# Patient Record
Sex: MALE | Race: WHITE | NOT HISPANIC OR LATINO | Employment: UNEMPLOYED | URBAN - METROPOLITAN AREA
[De-identification: names, ages, dates, MRNs, and addresses within clinical notes are randomized per-mention and may not be internally consistent; named-entity substitution may affect disease eponyms.]

---

## 2021-09-30 PROBLEM — K51.90 ULCERATIVE COLITIS: Status: ACTIVE | Noted: 2021-09-30

## 2021-09-30 PROBLEM — I10 HTN (HYPERTENSION): Status: ACTIVE | Noted: 2021-09-30

## 2021-09-30 PROBLEM — R07.9 CHEST PAIN: Status: ACTIVE | Noted: 2021-09-30

## 2021-09-30 PROBLEM — F12.90 MARIJUANA USE: Status: ACTIVE | Noted: 2021-09-30

## 2021-09-30 PROBLEM — R79.89 LOW VITAMIN D LEVEL: Status: ACTIVE | Noted: 2021-09-30

## 2021-09-30 PROBLEM — M54.9 BACK PAIN: Status: ACTIVE | Noted: 2021-09-30

## 2021-10-01 PROBLEM — F17.210 CIGARETTE NICOTINE DEPENDENCE WITHOUT COMPLICATION: Status: ACTIVE | Noted: 2021-10-01

## 2021-10-01 PROBLEM — R00.1 BRADYCARDIA: Status: ACTIVE | Noted: 2021-10-01

## 2021-10-01 PROBLEM — R74.01 TRANSAMINITIS: Status: ACTIVE | Noted: 2021-10-01

## 2022-04-12 PROBLEM — Z72.0 TOBACCO USE: Status: ACTIVE | Noted: 2022-04-12

## 2022-04-12 PROBLEM — F12.90 MARIJUANA USE: Status: RESOLVED | Noted: 2021-09-30 | Resolved: 2022-04-12

## 2023-01-23 PROBLEM — M51.36 DEGENERATIVE DISC DISEASE, LUMBAR: Status: ACTIVE | Noted: 2023-01-23

## 2023-01-23 PROBLEM — M51.369 DEGENERATIVE DISC DISEASE, LUMBAR: Status: ACTIVE | Noted: 2023-01-23

## 2023-02-22 PROBLEM — M19.131: Status: ACTIVE | Noted: 2023-02-22

## 2023-02-22 PROBLEM — S62.001S: Status: ACTIVE | Noted: 2023-02-22

## 2023-07-10 PROBLEM — M54.50 LOW BACK PAIN RADIATING TO RIGHT LEG: Status: ACTIVE | Noted: 2021-09-30

## 2023-07-10 PROBLEM — R20.0 NUMBNESS AND TINGLING: Status: ACTIVE | Noted: 2023-07-10

## 2023-07-10 PROBLEM — M79.604 LOW BACK PAIN RADIATING TO RIGHT LEG: Status: ACTIVE | Noted: 2021-09-30

## 2023-07-10 PROBLEM — R20.2 NUMBNESS AND TINGLING: Status: ACTIVE | Noted: 2023-07-10

## 2023-08-22 PROBLEM — M48.062 SPINAL STENOSIS OF LUMBAR REGION WITH NEUROGENIC CLAUDICATION: Status: ACTIVE | Noted: 2023-08-22

## 2025-07-26 RX ORDER — IBUPROFEN 200 MG
16 TABLET ORAL
Status: CANCELLED | OUTPATIENT
Start: 2025-07-26

## 2025-07-26 RX ORDER — SODIUM CHLORIDE 0.9 % (FLUSH) 0.9 %
10 SYRINGE (ML) INJECTION EVERY 12 HOURS PRN
Status: CANCELLED | OUTPATIENT
Start: 2025-07-26

## 2025-07-26 RX ORDER — IBUPROFEN 200 MG
24 TABLET ORAL
Status: CANCELLED | OUTPATIENT
Start: 2025-07-26

## 2025-07-26 RX ORDER — NALOXONE HCL 0.4 MG/ML
0.02 VIAL (ML) INJECTION
Status: CANCELLED | OUTPATIENT
Start: 2025-07-26

## 2025-07-26 RX ORDER — GLUCAGON 1 MG
1 KIT INJECTION
Status: CANCELLED | OUTPATIENT
Start: 2025-07-26

## 2025-07-26 NOTE — PROVIDER TRANSFER
Outside Transfer Acceptance Note / [facility]    Referring facility:    Referring provider: AFRICA ARCE  Accepting facility: [facility]  Accepting provider: ILENE SPRAGUE  Admitting provider: Dr. Ilene Sprague  Reason for transfer: Higher Level of Care   Transfer diagnosis: alcoholic cirrhosis of liver  Transfer specialty requested: Hepatology  Transfer specialty notified: No  Transfer level: NUMBER 1-5: 2  Bed type requested: telemetry  Isolation status: No active isolations   Admission class or status: IP- Inpatient      Narrative     65 year old male with PMHx of alcoholic cirrhosis and was just d/c from [facility] one week ago where he had an MRCP which was negative for stones who presented with fatigue/weakness and jaundice.     In the ED, vitals stable however labs show elevated LFT's with Total bilirubin of 12, , , alk phos 646, INR 2.0. Blood cultures in process, ammonia level normal. US shows biliary dilatation and cannot rule out choledocholithiasis. ED provider spoke with GI who recommended transfer for hepatology due to MELD of 24.     Vitals stable. /64, HR 80, 95% on RA and afebrile. Has mild ascites but not enough to do a tap. Patient is alert and oriented, not encephalopathic. Given IV zosyn and stable for transfer for hepatology evaluation.     Objective     Vitals:    Recent Labs: All pertinent labs within the past 24 hours have been reviewed.  Recent imaging: as above   Airway:     Vent settings:         IV access:    Infusions: None  Allergies: Review of patient's allergies indicates:  No Known Allergies   NPO: No    Anticoagulation:   Anticoagulants       None             Instructions      [facility]-  Admit to Hospital Medicine  Upon arrival to the floor, please send a SecureChat to [facility]. If there are emergent issues, or if there is no response to the SecureChat within 15 minutes, call extension [phone] (if no answer, do  NOT leave a callback number after the beep, rather please call the  to connect you with the  Hospital Medicine admit line on-call physician), for Hospital Medicine admit team assignment and for additional admit orders for the patient. Do not page the attending physician associated with the patient on arrival (this physician may not be on duty at the time of arrival). Rather, always send a SecureChat to [facility] (subsequently call [phone] if needed) to reach the triage physician for orders and team assignment.    Consult hepatology

## 2025-07-27 NOTE — PROVIDER TRANSFER
Update:  See Dr Schwartz's initial  acceptance note. Boardingin ED >24 hours, so reconnected with referring MD.  For this cirrhotic patient with probable choledocholithiasis and ascending cholangitis, found to have E coli bacteremia on initial BCx, has been covered by Zosyn the whole time which is good, and clinically improving. ED asked their hospitalist to see him in ED today. WBC count 18 initially, down to 14 yesterday, no labs done today. Zosyn hanging right now around 530pm so can time the next dose whenever it will be due -- very important for no gap in ABx given the g neg bacteremia and still needs source control with AES for ERCP. VS good 120/76, 97 O2 sat, HR 70. [facility] [place] notified of update. Bed assigned on TSU [id number].

## 2025-07-28 ENCOUNTER — ANESTHESIA EVENT (OUTPATIENT)
Dept: ENDOSCOPY | Facility: HOSPITAL | Age: 65
End: 2025-07-28
Payer: MEDICARE

## 2025-07-28 ENCOUNTER — HOSPITAL ENCOUNTER (INPATIENT)
Facility: HOSPITAL | Age: 65
LOS: 2 days | Discharge: HOME | DRG: 871 | End: 2025-07-30
Attending: STUDENT IN AN ORGANIZED HEALTH CARE EDUCATION/TRAINING PROGRAM | Admitting: STUDENT IN AN ORGANIZED HEALTH CARE EDUCATION/TRAINING PROGRAM
Payer: MEDICARE

## 2025-07-28 DIAGNOSIS — K70.30 ALCOHOLIC CIRRHOSIS: ICD-10-CM

## 2025-07-28 DIAGNOSIS — R74.01 TRANSAMINITIS: ICD-10-CM

## 2025-07-28 DIAGNOSIS — Z91.89 AT RISK FOR LONG QT SYNDROME: ICD-10-CM

## 2025-07-28 DIAGNOSIS — I73.9 PAD (PERIPHERAL ARTERY DISEASE): ICD-10-CM

## 2025-07-28 DIAGNOSIS — B96.20 E COLI BACTEREMIA: ICD-10-CM

## 2025-07-28 DIAGNOSIS — R07.9 CHEST PAIN: ICD-10-CM

## 2025-07-28 DIAGNOSIS — K83.1 BILIARY OBSTRUCTION: Primary | ICD-10-CM

## 2025-07-28 DIAGNOSIS — R78.81 E COLI BACTEREMIA: ICD-10-CM

## 2025-07-28 PROBLEM — D72.829 LEUCOCYTOSIS: Status: ACTIVE | Noted: 2025-07-28

## 2025-07-28 PROBLEM — E78.5 HLD (HYPERLIPIDEMIA): Status: ACTIVE | Noted: 2025-07-28

## 2025-07-28 PROBLEM — K74.60 DECOMPENSATED CIRRHOSIS: Status: ACTIVE | Noted: 2025-07-28

## 2025-07-28 PROBLEM — K83.8 DILATION OF BILIARY TRACT: Status: ACTIVE | Noted: 2025-07-28

## 2025-07-28 PROBLEM — K72.90 DECOMPENSATED CIRRHOSIS: Status: ACTIVE | Noted: 2025-07-28

## 2025-07-28 PROBLEM — A41.9 SEPSIS: Status: ACTIVE | Noted: 2025-07-28

## 2025-07-28 PROBLEM — I10 HTN (HYPERTENSION): Status: ACTIVE | Noted: 2025-07-28

## 2025-07-28 PROBLEM — D75.839 THROMBOCYTOSIS: Status: ACTIVE | Noted: 2025-07-28

## 2025-07-28 PROBLEM — E87.6 HYPOKALEMIA: Status: ACTIVE | Noted: 2025-07-28

## 2025-07-28 PROBLEM — F32.A DEPRESSION: Status: ACTIVE | Noted: 2025-07-28

## 2025-07-28 PROBLEM — D64.9 NORMOCYTIC ANEMIA: Status: ACTIVE | Noted: 2025-07-28

## 2025-07-28 PROBLEM — E80.6 HYPERBILIRUBINEMIA: Status: ACTIVE | Noted: 2025-07-28

## 2025-07-28 PROBLEM — F10.90 ALCOHOL USE: Status: ACTIVE | Noted: 2025-07-28

## 2025-07-28 LAB
ABSOLUTE EOSINOPHIL (OHS): 0.24 K/UL
ABSOLUTE MONOCYTE (OHS): 0.88 K/UL (ref 0.3–1)
ABSOLUTE NEUTROPHIL COUNT (OHS): 10.42 K/UL (ref 1.8–7.7)
ALBUMIN FLD-MCNC: <0.4 G/DL
ALBUMIN SERPL BCP-MCNC: 2 G/DL (ref 3.5–5.2)
ALP SERPL-CCNC: 671 UNIT/L (ref 40–150)
ALT SERPL W/O P-5'-P-CCNC: 104 UNIT/L (ref 0–55)
AMMONIA PLAS-SCNC: 23 UMOL/L (ref 10–50)
ANION GAP (OHS): 10 MMOL/L (ref 8–16)
APPEARANCE FLD: CLEAR
APTT PPP: 25.8 SECONDS (ref 21–32)
AST SERPL-CCNC: 186 UNIT/L (ref 0–50)
BASOPHILS # BLD AUTO: 0.14 K/UL
BASOPHILS NFR BLD AUTO: 1 %
BILIRUB DIRECT SERPL-MCNC: 6.1 MG/DL (ref 0.1–0.3)
BILIRUB SERPL-MCNC: 9 MG/DL (ref 0.1–1)
BILIRUB UR QL STRIP.AUTO: ABNORMAL
BUN SERPL-MCNC: 15 MG/DL (ref 8–23)
CALCIUM SERPL-MCNC: 8.7 MG/DL (ref 8.7–10.5)
CHLORIDE SERPL-SCNC: 104 MMOL/L (ref 95–110)
CLARITY UR: CLEAR
CO2 SERPL-SCNC: 24 MMOL/L (ref 23–29)
COLOR FLD: YELLOW
COLOR UR AUTO: YELLOW
CREAT SERPL-MCNC: 0.6 MG/DL (ref 0.5–1.4)
CREAT UR-MCNC: 115 MG/DL (ref 23–375)
CRP SERPL-MCNC: 75.78 MG/L
ERYTHROCYTE [DISTWIDTH] IN BLOOD BY AUTOMATED COUNT: 16.3 % (ref 11.5–14.5)
ETHANOL SERPL-MCNC: <10 MG/DL
FERRITIN SERPL-MCNC: 1259 NG/ML (ref 20–300)
GFR SERPLBLD CREATININE-BSD FMLA CKD-EPI: >60 ML/MIN/1.73/M2
GGT SERPL-CCNC: 226 U/L (ref 8–55)
GGT SERPL-CCNC: 230 U/L (ref 8–55)
GLUCOSE SERPL-MCNC: 103 MG/DL (ref 70–110)
GLUCOSE UR QL STRIP: NEGATIVE
GRAM STN SPEC: NORMAL
GRAM STN SPEC: NORMAL
HCT VFR BLD AUTO: 34.3 % (ref 40–54)
HGB BLD-MCNC: 11 GM/DL (ref 14–18)
HGB UR QL STRIP: ABNORMAL
IMM GRANULOCYTES # BLD AUTO: 0.16 K/UL (ref 0–0.04)
IMM GRANULOCYTES NFR BLD AUTO: 1.2 % (ref 0–0.5)
INR PPP: 1.2 (ref 0.8–1.2)
IRON SATN MFR SERPL: 41 % (ref 20–50)
IRON SERPL-MCNC: 79 UG/DL (ref 45–160)
KETONES UR QL STRIP: NEGATIVE
LACTATE SERPL-SCNC: 1.5 MMOL/L (ref 0.5–2.2)
LDH FLD L TO P-CCNC: 39 U/L
LEUKOCYTE ESTERASE UR QL STRIP: NEGATIVE
LYMPHOCYTES # BLD AUTO: 1.77 K/UL (ref 1–4.8)
LYMPHOCYTES NFR FLD MANUAL: 49 %
MAGNESIUM SERPL-MCNC: 1.9 MG/DL (ref 1.6–2.6)
MCH RBC QN AUTO: 31.3 PG (ref 27–31)
MCHC RBC AUTO-ENTMCNC: 32.1 G/DL (ref 32–36)
MCV RBC AUTO: 98 FL (ref 82–98)
MESOTHL CELL NFR FLD MANUAL: 4 %
MONOS+MACROS NFR FLD MANUAL: 19 %
NEUTROPHILS NFR FLD MANUAL: 28 %
NITRITE UR QL STRIP: NEGATIVE
NUCLEATED RBC (/100WBC) (OHS): 0 /100 WBC
PH UR STRIP: 6 [PH]
PHOSPHATE SERPL-MCNC: 3.5 MG/DL (ref 2.7–4.5)
PLATELET # BLD AUTO: 736 K/UL (ref 150–450)
PMV BLD AUTO: 8.7 FL (ref 9.2–12.9)
POCT GLUCOSE: 109 MG/DL (ref 70–110)
POCT GLUCOSE: 113 MG/DL (ref 70–110)
POCT GLUCOSE: 131 MG/DL (ref 70–110)
POCT GLUCOSE: 95 MG/DL (ref 70–110)
POTASSIUM SERPL-SCNC: 3.2 MMOL/L (ref 3.5–5.1)
PROCALCITONIN SERPL-MCNC: 0.47 NG/ML
PROT FLD-MCNC: <1 G/DL
PROT SERPL-MCNC: 6.9 GM/DL (ref 6–8.4)
PROT UR QL STRIP: NEGATIVE
PROTHROMBIN TIME: 12.7 SECONDS (ref 9–12.5)
RBC # BLD AUTO: 3.51 M/UL (ref 4.6–6.2)
RELATIVE EOSINOPHIL (OHS): 1.8 %
RELATIVE LYMPHOCYTE (OHS): 13 % (ref 18–48)
RELATIVE MONOCYTE (OHS): 6.5 % (ref 4–15)
RELATIVE NEUTROPHIL (OHS): 76.5 % (ref 38–73)
SODIUM SERPL-SCNC: 138 MMOL/L (ref 136–145)
SODIUM UR-SCNC: 76 MMOL/L (ref 20–250)
SP GR UR STRIP: 1.02
TIBC SERPL-MCNC: 195 UG/DL (ref 250–450)
TRANSFERRIN SERPL-MCNC: 132 MG/DL (ref 200–375)
UROBILINOGEN UR STRIP-ACNC: ABNORMAL EU/DL
WBC # BLD AUTO: 13.61 K/UL (ref 3.9–12.7)
WBC # FLD: 10 /CU MM

## 2025-07-28 PROCEDURE — 89051 BODY FLUID CELL COUNT: CPT | Performed by: HOSPITALIST

## 2025-07-28 PROCEDURE — 36415 COLL VENOUS BLD VENIPUNCTURE: CPT | Performed by: HOSPITALIST

## 2025-07-28 PROCEDURE — 80053 COMPREHEN METABOLIC PANEL: CPT | Performed by: STUDENT IN AN ORGANIZED HEALTH CARE EDUCATION/TRAINING PROGRAM

## 2025-07-28 PROCEDURE — 83605 ASSAY OF LACTIC ACID: CPT | Performed by: STUDENT IN AN ORGANIZED HEALTH CARE EDUCATION/TRAINING PROGRAM

## 2025-07-28 PROCEDURE — 87075 CULTR BACTERIA EXCEPT BLOOD: CPT | Performed by: HOSPITALIST

## 2025-07-28 PROCEDURE — 36415 COLL VENOUS BLD VENIPUNCTURE: CPT | Performed by: STUDENT IN AN ORGANIZED HEALTH CARE EDUCATION/TRAINING PROGRAM

## 2025-07-28 PROCEDURE — 63600175 PHARM REV CODE 636 W HCPCS: Performed by: STUDENT IN AN ORGANIZED HEALTH CARE EDUCATION/TRAINING PROGRAM

## 2025-07-28 PROCEDURE — 0W9G3ZZ DRAINAGE OF PERITONEAL CAVITY, PERCUTANEOUS APPROACH: ICD-10-PCS | Performed by: HOSPITALIST

## 2025-07-28 PROCEDURE — 25000003 PHARM REV CODE 250: Performed by: STUDENT IN AN ORGANIZED HEALTH CARE EDUCATION/TRAINING PROGRAM

## 2025-07-28 PROCEDURE — 87205 SMEAR GRAM STAIN: CPT | Performed by: HOSPITALIST

## 2025-07-28 PROCEDURE — 82042 OTHER SOURCE ALBUMIN QUAN EA: CPT | Performed by: HOSPITALIST

## 2025-07-28 PROCEDURE — 84300 ASSAY OF URINE SODIUM: CPT | Performed by: STUDENT IN AN ORGANIZED HEALTH CARE EDUCATION/TRAINING PROGRAM

## 2025-07-28 PROCEDURE — 84100 ASSAY OF PHOSPHORUS: CPT | Performed by: STUDENT IN AN ORGANIZED HEALTH CARE EDUCATION/TRAINING PROGRAM

## 2025-07-28 PROCEDURE — 82570 ASSAY OF URINE CREATININE: CPT | Performed by: STUDENT IN AN ORGANIZED HEALTH CARE EDUCATION/TRAINING PROGRAM

## 2025-07-28 PROCEDURE — 85025 COMPLETE CBC W/AUTO DIFF WBC: CPT | Performed by: STUDENT IN AN ORGANIZED HEALTH CARE EDUCATION/TRAINING PROGRAM

## 2025-07-28 PROCEDURE — 82728 ASSAY OF FERRITIN: CPT | Performed by: STUDENT IN AN ORGANIZED HEALTH CARE EDUCATION/TRAINING PROGRAM

## 2025-07-28 PROCEDURE — 87040 BLOOD CULTURE FOR BACTERIA: CPT | Performed by: STUDENT IN AN ORGANIZED HEALTH CARE EDUCATION/TRAINING PROGRAM

## 2025-07-28 PROCEDURE — 86141 C-REACTIVE PROTEIN HS: CPT | Performed by: STUDENT IN AN ORGANIZED HEALTH CARE EDUCATION/TRAINING PROGRAM

## 2025-07-28 PROCEDURE — 82977 ASSAY OF GGT: CPT | Performed by: STUDENT IN AN ORGANIZED HEALTH CARE EDUCATION/TRAINING PROGRAM

## 2025-07-28 PROCEDURE — 83735 ASSAY OF MAGNESIUM: CPT | Performed by: STUDENT IN AN ORGANIZED HEALTH CARE EDUCATION/TRAINING PROGRAM

## 2025-07-28 PROCEDURE — 99223 1ST HOSP IP/OBS HIGH 75: CPT | Mod: GC,,, | Performed by: STUDENT IN AN ORGANIZED HEALTH CARE EDUCATION/TRAINING PROGRAM

## 2025-07-28 PROCEDURE — 82140 ASSAY OF AMMONIA: CPT | Performed by: STUDENT IN AN ORGANIZED HEALTH CARE EDUCATION/TRAINING PROGRAM

## 2025-07-28 PROCEDURE — 87070 CULTURE OTHR SPECIMN AEROBIC: CPT | Performed by: HOSPITALIST

## 2025-07-28 PROCEDURE — 84157 ASSAY OF PROTEIN OTHER: CPT | Performed by: HOSPITALIST

## 2025-07-28 PROCEDURE — 83540 ASSAY OF IRON: CPT | Performed by: STUDENT IN AN ORGANIZED HEALTH CARE EDUCATION/TRAINING PROGRAM

## 2025-07-28 PROCEDURE — 80321 ALCOHOLS BIOMARKERS 1OR 2: CPT | Performed by: STUDENT IN AN ORGANIZED HEALTH CARE EDUCATION/TRAINING PROGRAM

## 2025-07-28 PROCEDURE — 20600001 HC STEP DOWN PRIVATE ROOM

## 2025-07-28 PROCEDURE — 81003 URINALYSIS AUTO W/O SCOPE: CPT | Performed by: STUDENT IN AN ORGANIZED HEALTH CARE EDUCATION/TRAINING PROGRAM

## 2025-07-28 PROCEDURE — 83615 LACTATE (LD) (LDH) ENZYME: CPT | Performed by: HOSPITALIST

## 2025-07-28 PROCEDURE — 25000003 PHARM REV CODE 250: Performed by: HOSPITALIST

## 2025-07-28 PROCEDURE — 85610 PROTHROMBIN TIME: CPT | Performed by: STUDENT IN AN ORGANIZED HEALTH CARE EDUCATION/TRAINING PROGRAM

## 2025-07-28 PROCEDURE — 63600175 PHARM REV CODE 636 W HCPCS: Performed by: FAMILY MEDICINE

## 2025-07-28 PROCEDURE — G0545 PR VISIT INHERENT TO INPT OR OBS CARE, INFECTIOUS DISEASE: HCPCS | Mod: ,,, | Performed by: STUDENT IN AN ORGANIZED HEALTH CARE EDUCATION/TRAINING PROGRAM

## 2025-07-28 PROCEDURE — 82077 ASSAY SPEC XCP UR&BREATH IA: CPT | Performed by: STUDENT IN AN ORGANIZED HEALTH CARE EDUCATION/TRAINING PROGRAM

## 2025-07-28 PROCEDURE — 99223 1ST HOSP IP/OBS HIGH 75: CPT | Mod: 25,GC,, | Performed by: INTERNAL MEDICINE

## 2025-07-28 PROCEDURE — 82248 BILIRUBIN DIRECT: CPT | Performed by: STUDENT IN AN ORGANIZED HEALTH CARE EDUCATION/TRAINING PROGRAM

## 2025-07-28 PROCEDURE — 87040 BLOOD CULTURE FOR BACTERIA: CPT | Performed by: HOSPITALIST

## 2025-07-28 PROCEDURE — 84145 PROCALCITONIN (PCT): CPT | Performed by: STUDENT IN AN ORGANIZED HEALTH CARE EDUCATION/TRAINING PROGRAM

## 2025-07-28 PROCEDURE — 85730 THROMBOPLASTIN TIME PARTIAL: CPT | Performed by: STUDENT IN AN ORGANIZED HEALTH CARE EDUCATION/TRAINING PROGRAM

## 2025-07-28 RX ORDER — TALC
6 POWDER (GRAM) TOPICAL NIGHTLY PRN
Status: DISCONTINUED | OUTPATIENT
Start: 2025-07-28 | End: 2025-07-30 | Stop reason: HOSPADM

## 2025-07-28 RX ORDER — ACETAMINOPHEN 325 MG/1
650 TABLET ORAL EVERY 4 HOURS PRN
Status: DISCONTINUED | OUTPATIENT
Start: 2025-07-28 | End: 2025-07-28

## 2025-07-28 RX ORDER — NALOXONE HCL 0.4 MG/ML
0.4 VIAL (ML) INJECTION
Status: DISCONTINUED | OUTPATIENT
Start: 2025-07-28 | End: 2025-07-30 | Stop reason: HOSPADM

## 2025-07-28 RX ORDER — POLYETHYLENE GLYCOL 3350 17 G/17G
17 POWDER, FOR SOLUTION ORAL DAILY PRN
Status: DISCONTINUED | OUTPATIENT
Start: 2025-07-28 | End: 2025-07-30 | Stop reason: HOSPADM

## 2025-07-28 RX ORDER — GLUCAGON 1 MG
1 KIT INJECTION
Status: DISCONTINUED | OUTPATIENT
Start: 2025-07-28 | End: 2025-07-30 | Stop reason: HOSPADM

## 2025-07-28 RX ORDER — MUPIROCIN 20 MG/G
OINTMENT TOPICAL 2 TIMES DAILY
Status: DISCONTINUED | OUTPATIENT
Start: 2025-07-28 | End: 2025-07-30 | Stop reason: HOSPADM

## 2025-07-28 RX ORDER — IBUPROFEN 200 MG
16 TABLET ORAL
Status: DISCONTINUED | OUTPATIENT
Start: 2025-07-28 | End: 2025-07-30 | Stop reason: HOSPADM

## 2025-07-28 RX ORDER — PROCHLORPERAZINE MALEATE 5 MG
5 TABLET ORAL EVERY 6 HOURS PRN
Status: DISCONTINUED | OUTPATIENT
Start: 2025-07-28 | End: 2025-07-30 | Stop reason: HOSPADM

## 2025-07-28 RX ORDER — ACETAMINOPHEN 325 MG/1
650 TABLET ORAL EVERY 4 HOURS PRN
Status: DISCONTINUED | OUTPATIENT
Start: 2025-07-28 | End: 2025-07-30 | Stop reason: HOSPADM

## 2025-07-28 RX ORDER — SODIUM CHLORIDE 0.9 % (FLUSH) 0.9 %
10 SYRINGE (ML) INJECTION EVERY 12 HOURS PRN
Status: DISCONTINUED | OUTPATIENT
Start: 2025-07-28 | End: 2025-07-30 | Stop reason: HOSPADM

## 2025-07-28 RX ORDER — BUPROPION HYDROCHLORIDE 100 MG/1
100 TABLET ORAL 2 TIMES DAILY
Status: DISCONTINUED | OUTPATIENT
Start: 2025-07-28 | End: 2025-07-30 | Stop reason: HOSPADM

## 2025-07-28 RX ORDER — GABAPENTIN 300 MG/1
600 CAPSULE ORAL 2 TIMES DAILY
Status: DISCONTINUED | OUTPATIENT
Start: 2025-07-28 | End: 2025-07-30 | Stop reason: HOSPADM

## 2025-07-28 RX ORDER — LIDOCAINE HYDROCHLORIDE 10 MG/ML
INJECTION, SOLUTION INFILTRATION; PERINEURAL
Status: COMPLETED | OUTPATIENT
Start: 2025-07-28 | End: 2025-07-28

## 2025-07-28 RX ORDER — IBUPROFEN 200 MG
24 TABLET ORAL
Status: DISCONTINUED | OUTPATIENT
Start: 2025-07-28 | End: 2025-07-30 | Stop reason: HOSPADM

## 2025-07-28 RX ORDER — POTASSIUM CHLORIDE 20 MEQ/1
40 TABLET, EXTENDED RELEASE ORAL ONCE
Status: COMPLETED | OUTPATIENT
Start: 2025-07-28 | End: 2025-07-28

## 2025-07-28 RX ORDER — AMOXICILLIN 250 MG
1 CAPSULE ORAL 2 TIMES DAILY PRN
Status: DISCONTINUED | OUTPATIENT
Start: 2025-07-28 | End: 2025-07-30 | Stop reason: HOSPADM

## 2025-07-28 RX ADMIN — BUPROPION HYDROCHLORIDE 100 MG: 100 TABLET, FILM COATED ORAL at 10:07

## 2025-07-28 RX ADMIN — POTASSIUM BICARBONATE 40 MEQ: 391 TABLET, EFFERVESCENT ORAL at 03:07

## 2025-07-28 RX ADMIN — GABAPENTIN 600 MG: 300 CAPSULE ORAL at 09:07

## 2025-07-28 RX ADMIN — MUPIROCIN: 20 OINTMENT TOPICAL at 09:07

## 2025-07-28 RX ADMIN — PIPERACILLIN SODIUM AND TAZOBACTAM SODIUM 4.5 G: 4; .5 INJECTION, POWDER, LYOPHILIZED, FOR SOLUTION INTRAVENOUS at 03:07

## 2025-07-28 RX ADMIN — PIPERACILLIN SODIUM AND TAZOBACTAM SODIUM 4.5 G: 4; .5 INJECTION, POWDER, LYOPHILIZED, FOR SOLUTION INTRAVENOUS at 11:07

## 2025-07-28 RX ADMIN — BUPROPION HYDROCHLORIDE 100 MG: 100 TABLET, FILM COATED ORAL at 09:07

## 2025-07-28 RX ADMIN — GABAPENTIN 600 MG: 300 CAPSULE ORAL at 10:07

## 2025-07-28 RX ADMIN — LIDOCAINE HYDROCHLORIDE 5 ML: 10 INJECTION, SOLUTION INFILTRATION; PERINEURAL at 09:07

## 2025-07-28 RX ADMIN — MUPIROCIN: 20 OINTMENT TOPICAL at 10:07

## 2025-07-28 RX ADMIN — LACTULOSE 10 G: 20 SOLUTION ORAL at 09:07

## 2025-07-28 RX ADMIN — LACTULOSE 10 G: 20 SOLUTION ORAL at 10:07

## 2025-07-28 RX ADMIN — POTASSIUM CHLORIDE 40 MEQ: 1500 TABLET, EXTENDED RELEASE ORAL at 10:07

## 2025-07-28 RX ADMIN — PIPERACILLIN SODIUM AND TAZOBACTAM SODIUM 4.5 G: 4; .5 INJECTION, POWDER, LYOPHILIZED, FOR SOLUTION INTRAVENOUS at 06:07

## 2025-07-28 NOTE — H&P
[facility] - Transplant [facility] Medicine  History & Physical    Patient Name: Eric Ma  MRN: [id number]  Patient Class: IP- Inpatient  Admission Date: 7/28/2025  Attending Physician: Lake Jones MD   Primary Care Provider: Adelita Hernandez NP      Patient information was obtained from patient, past medical records, and ER records.     Subjective:     Principal Problem:E coli bacteremia    Chief Complaint: decompensated cirrhosis     HPI: Eric Ma is 65 y.o. male with history of alcohol use disorder (quit 04/2025, intermittently sober over the last 2yrs), decompensated cirrhosis, UC, HTN, HLD, and depression who presents as a transfer for evaluation of biliary obstruction and decompensated cirrhosis with sepsis 2/2 e coli bacteremia concerning for ascending cholangitis.     Pt initially dx with cirrhosis thought to be alcoholic in nature in 04/2024 while following with his GI outpatient. Elevated LFTs, hyperbili noted initially 6/30 for which pt was admitted to OSH at that time with workup showing mild biliary duct dilation. MRCP performed at that time that did not show choledocholithiasis or significant ductal dilation and pt's LFTs remained elevated but bili downtrended. Pt discharged with GI follow up. However pt with persistent abd pain, distention, and jaundice as well as fever/chills so represented with reoccurrence of worsening cholestatic pattern of labs as well as elevated WBC to 18k and procal 1.2. CBD dilation noted again on U/S as well as moderate ascites at OSH 7/25 w/o evidence of choledocholithiasis. Pt discussed with GI at [facility] who recommended transfer for evaluation with hepatology and other services. Pt also found to have e coli bacteremia during workup while awaiting transfer for which he was started on zosyn with improvement in WBC and procal as well as symptomatically.    At time of admit evaluation, pt reports that he overall feels improved. His fever/chills  and abd pain have improved/nearly resolved. His abd distention remains stable. He notes some mild BL LE edema that he states occurred during transport. He reports that he has chronic left sided chest/rib soreness from a prior crush injury which required hardware implantation which is stable. He otherwise denies URI sxs, cough, dyspnea, hematochezia/melena, constipation, diarrhea, nausea, vomiting, decreased UOP, dysuria, dizziness, syncope, confusion.          Past Medical History:   Diagnosis Date    Back pain     Hypertension     Ulcerative colitis        Past Surgical History:   Procedure Laterality Date    ANGIOGRAM, CORONARY, WITH LEFT HEART CATHETERIZATION  10/01/2021    Procedure: Angiogram, Coronary, with Left Heart Cath;  Surgeon: Gabriele Reynolds MD;  Location: [facility] CATH LAB;  Service: Cardiology;;    APPENDECTOMY      CARPECTOMY Right 02/09/2023    Procedure: CARPECTOMY;  Surgeon: Rai Carroll MD;  Location: [facility] MAIN OR;  Service: Orthopedics;  Laterality: Right;  PRC    FRACTURE SURGERY      LAMINECTOMY, SPINE, MINIMALLY INVASIVE, POSTERIOR APPROACH N/A 8/22/2023    Procedure: L4-S1 Decompressive Lumbar Laminectomy;  Surgeon: Cynthia Velazquez MD;  Location: [facility] MAIN OR;  Service: Neurosurgery;  Laterality: N/A;       Review of patient's allergies indicates:  No Known Allergies    No current facility-administered medications on file prior to encounter.     Current Outpatient Medications on File Prior to Encounter   Medication Sig    atorvastatin (LIPITOR) 40 MG tablet Take 1 tablet (40 mg total) by mouth once daily.    buPROPion (WELLBUTRIN SR) 100 MG TBSR 12 hr tablet Take 100 mg by mouth 2 (two) times daily.    cholecalciferol, vitamin D3, (VITAMIN D3) 50 mcg (2,000 unit) Cap capsule Take 1 capsule (2,000 Units total) by mouth once daily.    gabapentin (NEURONTIN) 600 MG tablet Take 0.5 tablets (300 mg total) by mouth every evening. (Patient taking differently: Take 300 mg by mouth 3 (three)  times daily.)    isosorbide mononitrate (IMDUR) 30 MG 24 hr tablet Take 1 tablet (30 mg total) by mouth once daily.    lisinopriL (PRINIVIL,ZESTRIL) 5 MG tablet Take 1 tablet (5 mg total) by mouth once daily.    mesalamine (DELZICOL) 400 mg cdti cdti capsule Take 2 capsules (800 mg total) by mouth 3 (three) times daily.    nicotine (NICODERM CQ) 7 mg/24 hr Place 1 patch onto the skin once daily.    tiZANidine (ZANAFLEX) 4 MG tablet Take 4 mg by mouth every 8 (eight) hours as needed.     Family History       Problem Relation (Age of Onset)    Arthritis Mother, Father, Sister, Brother    Breast cancer Mother    Cancer Brother    Diabetes Brother    Drug abuse Sister, Brother    Early death Brother    Hearing loss Mother, Father    Heart disease Father    Hypertension Mother, Father          Tobacco Use    Smoking status: Never    Smokeless tobacco: Never   Substance and Sexual Activity    Alcohol use: Not Currently     Comment: not for a year    Drug use: Not Currently     Types: Marijuana    Sexual activity: Not Currently       Review of Systems   Constitutional:  Positive for chills and fever.   HENT:  Negative for congestion, ear pain, rhinorrhea and sore throat.    Respiratory:  Negative for cough, shortness of breath and wheezing.    Cardiovascular:  Positive for leg swelling.        + for chronic chest wall soreness at site of prior procedure due to crush injury   Gastrointestinal:  Positive for abdominal distention. Negative for abdominal pain, blood in stool, constipation, diarrhea, nausea and vomiting.   Genitourinary:  Negative for decreased urine volume and dysuria.   Skin:  Positive for color change.   Neurological:  Negative for dizziness, syncope and light-headedness.   Psychiatric/Behavioral:  Negative for confusion.      Objective:     Vital Signs (Most Recent):  Temp: 97.6 °F (36.4 °C) (07/28/25 0022)  Pulse: 71 (07/28/25 0022)  Resp: 18 (07/28/25 0022)  BP: (!) 155/82 (07/28/25 0022)  SpO2: 97 %  (07/28/25 0022) Vital Signs (24h Range):  Temp:  [97.6 °F (36.4 °C)] 97.6 °F (36.4 °C)  Pulse:  [69-92] 71  Resp:  [18-20] 18  SpO2:  [97 %] 97 %  BP: (120-155)/(76-82) 155/82     Weight: 66 kg (145 lb 7 oz)  Body mass index is 22.44 kg/m².     Physical Exam  Vitals reviewed.   Constitutional:       General: He is not in acute distress.  HENT:      Head: Normocephalic and atraumatic.      Nose: Nose normal.      Mouth/Throat:      Mouth: Mucous membranes are moist.   Eyes:      General: Scleral icterus present.      Extraocular Movements: Extraocular movements intact.      Pupils: Pupils are equal, round, and reactive to light.   Cardiovascular:      Rate and Rhythm: Normal rate and regular rhythm.      Heart sounds: Normal heart sounds.   Pulmonary:      Effort: Pulmonary effort is normal. No respiratory distress.      Breath sounds: Normal breath sounds. No wheezing, rhonchi or rales.   Abdominal:      General: Abdomen is flat. Bowel sounds are normal. There is no distension.      Palpations: Abdomen is soft.      Tenderness: There is no abdominal tenderness. There is no guarding or rebound.   Musculoskeletal:      Cervical back: Normal range of motion and neck supple.      Right lower leg: Edema (1+ non-pitting edema) present.      Left lower leg: Edema (1+ non-pitting edema) present.   Skin:     General: Skin is warm and dry.      Coloration: Skin is jaundiced.   Neurological:      General: No focal deficit present.      Mental Status: He is alert and oriented to person, place, and time.              CRANIAL NERVES     CN III, IV, VI   Pupils are equal, round, and reactive to light.       Significant Labs: All pertinent labs within the past 24 hours have been reviewed. OSH labs reviewed.  Recent Lab Results       None            Significant Imaging: I have reviewed all pertinent imaging results/findings within the past 24 hours. OSH imaging review.  Assessment/Plan:     Assessment & Plan  E coli  "bacteremia  Likely GI in origin given his cirrhosis/biliary obstruction although evidence of definitive source likely    Plan  - Continue zosyn.  - Repeat Bcx here and follow up OSH culture data.  - Consult hepatology and AES. May also need IR vs med consult team for paracentesis to evaluate ascitic fluid.    Sepsis 2/2 e coli bacteremia  Patient has sepsis without organ dysfunction secondary to e coli bacteremia, likely GI in origin. A review of systems was completed. Patient's sepsis is improving    Current Antibiotics    piperacillin-tazobactam (ZOSYN) 4.5 g in D5W 100 mL IVPB (MB+), Every 8 hours (non-standard times), Intravenous    Lactate  Recent Labs   Lab 07/25/25  1639 07/28/25  0113   LACTATE 1.4 1.5     Culture Data  Blood Cultures No results found for: "CULTBLD"   mSOFA  MSOFA Total  Min: 0   Min taken time: 07/28/25 0200  Max: 3   Max taken time: 07/28/25 0300    Plan  - Antibiotics as listed above  - Fluid resuscitation as follows: performed at OSH, no further needed at this time.  - Follow up culture data and evaluate for more definitive source (cholangitis vs SBP).  - Vasopressors were not needed  - The following services were consulted: hepatology, AES.    Biliary obstruction  Elevated LFTs, hyperbili noted initially 6/30 for which pt was admitted to OSH at that time with workup showing mild biliary duct dilation. MRCP performed at that time that did not show choledocholithiasis or significant ductal dilation and pt's LFTs remained elevated but bili downtrended. Pt discharged with GI follow up. However pt with persistent abd pain and jaundice as well as fever so represented with reoccurrence of worsening cholestatic pattern of labs. CBD dilation noted again on U/S at OSH 7/25 w/o evidence of choledocholithiasis. Found to have e coli bacteremia from uncertain source although likely GI source (cholangitis vs SBP vs other).    Plan  - NPO for now for possible procedure.   - Consult AES.  - Continue " zosyn and follow up OSH culture data.  - Daily labs to include CMP with fractionated bili, coags, GGT.    Transaminitis  Likely multifactorial in setting of decompensated cirrhosis and biliary obstruction with infection. Further discussion and plan as in related problems.  Hyperbilirubinemia  Likely multifactorial in setting of decompensated cirrhosis and biliary obstruction with infection. Further discussion and plan as in related problems.   Decompensated cirrhosis  Co-morbidities are present and inclusive of ascites, portal hypertension, malnutrition, anemia/pancytopenia, and anticoagulation.  MELD-Na score calculated; MELD 3.0: 20 at 7/28/2025  1:13 AM  MELD-Na: 17 at 7/28/2025  1:13 AM  Calculated from:  Serum Creatinine: 0.6 mg/dL (Using min of 1 mg/dL) at 7/28/2025  1:13 AM  Serum Sodium: 138 mmol/L (Using max of 137 mmol/L) at 7/28/2025  1:13 AM  Total Bilirubin: 9 mg/dL at 7/28/2025  1:13 AM  Serum Albumin: 2 g/dL at 7/28/2025  1:13 AM  INR(ratio): 1.2 at 7/28/2025  1:13 AM  Age at listing (hypothetical): 65 years  Sex: Male at 7/28/2025  1:13 AM      Continue chronic meds. Etiology thought to be likely ETOH although with also known hx of UC. NAIF checked at OSH which was negative. Will avoid any hepatotoxic meds, and monitor CBC/CMP/INR for synthetic function. Consult hepatology and AES.  Ulcerative colitis  Followed by GI outpatient, on mesalamine.    Plan  - Hold mesalamine.    HTN (hypertension)  Patient's blood pressure range in the last 24 hours was: BP  Min: 120/76  Max: 155/82.The patient's inpatient anti-hypertensive regimen is listed below:  Current Antihypertensives       Plan  - Hold home regimen for now. Monitor and adjust PRN.    HLD (hyperlipidemia)  Plan  - Hold statin in setting of elevated LFTs.    Alcohol use  Pt reports he has been trying to quit over the last 2 yrs and had intermittent sobriety. He states he last drank in 04/2025. He denies any history of alcohol withdrawal.    Plan  -  CIWA q4 for now, likely able to discontinue pending stability. Likely low risk for withdrawal at this point.  - PETH ordered.  - Continue to  on and encourage cessation.    Normocytic anemia  Likely in setting of known liver dz and probable nutritional deficiencies. Most recent hemoglobin and hematocrit are listed below.  Recent Labs     07/28/25  0113   HGB 11.0*   HCT 34.3*     Plan  - Monitor serial CBC: Daily  - Order iron studies and vitamin levels for evaluation.   - Transfuse PRBC if patient becomes hemodynamically unstable, symptomatic or H/H drops below 7/21.  - Patient has not received any PRBC transfusions to date  - Patient's anemia is currently stable  Thrombocytosis  Likely reactive in setting of infection/inflammation with ongoing GI issues. Continue to monitor.    Depression  Plan  - Continue home wellbutrin.      Hypokalemia  Patient's most recent potassium results are listed below.   Recent Labs     07/28/25  0113   K 3.2*     Plan  - Replete potassium per protocol  - Monitor potassium Daily    VTE Risk Mitigation (From admission, onward)           Ordered     IP VTE HIGH RISK PATIENT  Once         07/28/25 0328     Place sequential compression device  Until discontinued         07/28/25 0328     Reason for No Pharmacological VTE Prophylaxis  Once        Comments: Hold for possible procedure.   Question:  Reasons:  Answer:  Physician Provided (leave comment)    07/28/25 0328     Place sequential compression device  Until discontinued         07/28/25 0052                         Precious Chambers DO  Department of Hospital Medicine  [facility] - Transplant Stepdown

## 2025-07-28 NOTE — ASSESSMENT & PLAN NOTE
Patient has sepsis without organ dysfunction secondary to e coli bacteremia, likely GI in origin. A review of systems was completed. Patient's sepsis is improving    Current Antibiotics    piperacillin-tazobactam (ZOSYN) 4.5 g in D5W 100 mL IVPB (MB+), Every 8 hours (non-standard times), Intravenous    Lactate  Recent Labs   Lab 07/25/25  1639 07/28/25  0113   LACTATE 1.4 1.5     Culture Data  Blood Cultures   Blood Culture   Date Value Ref Range Status   07/28/2025 No Growth After 6 Hours  Preliminary   07/28/2025 No Growth After 6 Hours  Preliminary      mSOFA  MSOFA Total  Min: 0   Min taken time: 07/28/25 0200  Max: 3   Max taken time: 07/28/25 1801    - Antibiotics as listed above  - Fluid resuscitation as follows: performed at OSH, no further needed at this time.  - Follow up culture data and evaluate for more definitive source (cholangitis vs SBP).  - Vasopressors were not needed  - The following services were consulted: hepatology, AES.

## 2025-07-28 NOTE — PROGRESS NOTES
Brice [facility] - Transplant [facility] Medicine  Progress Note    Patient Name: Eric Ma  MRN: [id number]  Patient Class: IP- Inpatient   Admission Date: 7/28/2025  Length of Stay: 0 days  Attending Physician: Lake Jones MD  Primary Care Provider: Adelita Hernandez NP        Subjective     Principal Problem:E coli bacteremia        HPI:  Eric Ma is 65 y.o. male with history of alcohol use disorder (quit 04/2025, intermittently sober over the last 2yrs), decompensated cirrhosis, UC, HTN, HLD, and depression who presents as a transfer for evaluation of biliary obstruction and decompensated cirrhosis with sepsis 2/2 e coli bacteremia concerning for ascending cholangitis.     Pt initially dx with cirrhosis thought to be alcoholic in nature in 04/2024 while following with his GI outpatient. Elevated LFTs, hyperbili noted initially 6/30 for which pt was admitted to OSH at that time with workup showing mild biliary duct dilation. MRCP performed at that time that did not show choledocholithiasis or significant ductal dilation and pt's LFTs remained elevated but bili downtrended. Pt discharged with GI follow up. However pt with persistent abd pain, distention, and jaundice as well as fever/chills so represented with reoccurrence of worsening cholestatic pattern of labs as well as elevated WBC to 18k and procal 1.2. CBD dilation noted again on U/S as well as moderate ascites at OSH 7/25 w/o evidence of choledocholithiasis. Pt discussed with [place] at [facility] [place] transfer for evaluation with hepatology and other services. Pt also found to have e coli bacteremia during workup while awaiting transfer for which he was started on zosyn with improvement in WBC and procal as well as symptomatically.    At time of admit evaluation, pt reports that he overall feels improved. His fever/chills and abd pain have improved/nearly resolved. His abd distention remains stable. He notes some mild BL LE edema  that he states occurred during transport. He reports that he has chronic left sided chest/rib soreness from a prior crush injury which required hardware implantation which is stable. He otherwise denies URI sxs, cough, dyspnea, hematochezia/melena, constipation, diarrhea, nausea, vomiting, decreased UOP, dysuria, dizziness, syncope, confusion.          Overview/Hospital Course:  7/28 E coli bacteremia - Likely GI in origin given his cirrhosis/biliary obstruction.  Continue IV zosyn.Repeat BC pending. US Liver - cirrhotic liver morphology. and measures 14.1 cm in the right midclavicular line. There is no evidence of hepatic mass. There is hepatopedal flow in the main portal vein. Moderate ascites. common bile duct is mildly dilated to 9 mm in diameter. Echogenic material in the common bile duct. No evidence of gallstones. Gallbladder wall thickening is nonspecific in the setting of ascites. Choledocholithiasis cannot be excluded on the basis of this exam. Consider MRCP.  Hepatology and AES consulted - Plan for ERCP tomorrow. NPO at midnight. IR consulted for paracentesis.1500 ml removed. labs pending. K replaced . bilateral feet to be purple and cool to touch. denies pain or claudication. Neurovascular checks. arterial US LE orderedc        Review of Systems:   Pain scale:  Constitutional:  fever,  chills, headache, vision loss, hearing loss, weight loss, Generalized weakness, falls, loss of smell, loss of taste, poor appetite,  sore throat  Respiratory: cough, shortness of breath.   Cardiovascular: chest pain, dizziness, palpitations, orthopnea, swelling of feet, syncope  Gastrointestinal: nausea, vomiting, abdominal pain, diarrhea, black stool,  blood in stool, change in bowel habits, constipation  Genitourinary: hematuria, dysuria, urgency, frequency  Integument/Breast: rash,  pruritis  Hematologic/Lymphatic: easy bruising, lymphadenopathy  Musculoskeletal: arthralgias , myalgias, back pain, neck pain, knee  pain  Neurological: confusion, seizures, tremors, slurred speech  Behavioral/Psych:  depression, anxiety, auditory or visual hallucinations     OBJECTIVE:     Physical Exam:  Body mass index is 22.44 kg/m².    Constitutional: Appears well-developed and well-nourished.   Head: Normocephalic and atraumatic. +icterus  Neck: Normal range of motion. Neck supple.   Cardiovascular: Normal heart rate.  Regular heart rhythm.  Pulmonary/Chest: Effort normal. chronic chest wall soreness (Prior crush injury)   Abdominal: + distension.  No tenderness  Musculoskeletal: Normal range of motion. no  edema.   Neurological: Alert and oriented to person, place, and time.   Skin: . bilateral feet to be purple and cool to touch   Psychiatric: Normal mood and affect. Behavior is normal.                  Vital Signs  Temp: 98.6 °F (37 °C) (07/28/25 1549)  Pulse: 96 (07/28/25 1605)  Resp: 18 (07/28/25 1549)  BP: 116/69 (07/28/25 1549)  SpO2: 98 % (07/28/25 1549)     24 Hour VS Range    Temp:  [97.6 °F (36.4 °C)-98.6 °F (37 °C)]   Pulse:  [68-96]   Resp:  [16-18]   BP: (116-155)/(69-86)   SpO2:  [97 %-98 %]     Intake/Output Summary (Last 24 hours) at 7/28/2025 1840  Last data filed at 7/28/2025 1635  Gross per 24 hour   Intake 480 ml   Output 1600 ml   Net -1120 ml         I/O This Shift:  I/O this shift:  In: 480 [P.O.:480]  Out: 1500 [Other:1500]    Wt Readings from Last 3 Encounters:   07/28/25 66 kg (145 lb 7 oz)   07/25/25 63.5 kg (139 lb 15.9 oz)   08/22/23 67.8 kg (149 lb 6.4 oz)       I have personally reviewed the vitals and recorded Intake/Output     Laboratory/Diagnostic Data:    CBC/Anemia Labs: Coags:    Recent Labs   Lab 07/28/25  0113   WBC 13.61*   HGB 11.0*   HCT 34.3*   *   MCV 98   RDW 16.3*   IRON 79   FERRITIN 1,259.0*    Recent Labs   Lab 07/28/25  0113   INR 1.2   APTT 25.8        Chemistries: ABG:   Recent Labs   Lab 07/28/25  0113      K 3.2*      CO2 24   BUN 15   CREATININE 0.6   CALCIUM 8.7  "  PROT 6.9   BILITOT 9.0*   ALKPHOS 671*   *   *   MG 1.9   PHOS 3.5    No results for input(s): "PH", "PCO2", "PO2", "HCO3", "POCSATURATED", "BE" in the last 168 hours.     POCT Glucose: HbA1c:    Recent Labs   Lab 07/28/25  0114 07/28/25  0752 07/28/25  1244   POCTGLUCOSE 113* 95 109    Hemoglobin A1C   Date Value Ref Range Status   10/19/2021 5.2 3.9 - 6.1 % Final     Comment:     ADA Screening Guidelines:  5.7-6.4%  Consistent with prediabetes  >or=6.5%  Consistent with diabetes    The Hemoglobin A1c assay has significant interference with   Fetal hemoglobin(HbF), producing a negative bias with this   assay. This assay should not be used to diagnose diabetes   during pregnancy, sickle cell trait, anemias, chronic   hepatitis or renal disease. These samples must be   assayed by an alternate method.          Cardiac Enzymes: Ejection Fractions:    No results for input(s): "CPK", "CPKMB", "MB", "TROPONINI" in the last 72 hours. EF   Date Value Ref Range Status   10/01/2021 60 % Final          Recent Labs   Lab 07/28/25  0147   COLORU Yellow   APPEARANCEUA Clear   PHUR 6.0   SPECGRAV 1.020   PROTEINUA Negative   GLUCUA Negative   BILIRUBINUA 1+*   OCCULTUA Trace*   NITRITE Negative   UROBILINOGEN 4.0-6.0*   LEUKOCYTESUR Negative       Procalcitonin (ng/mL)   Date Value   07/28/2025 0.47 (H)     Lactic Acid Level (mmol/L)   Date Value   07/28/2025 1.5     Lactic Acid (mmol/L)   Date Value   07/25/2025 1.4     No results found for: "BNP"  No results found for: "CRP", "SEDRATE"  D-Dimer (ng/mL)   Date Value   09/30/2021 269     Ferritin (ng/mL)   Date Value   07/28/2025 1,259.0 (H)     No results found for: "LDH"  Troponin I (ng/mL)   Date Value   10/01/2021 <0.020   10/01/2021 <0.020   09/30/2021 <0.020   09/30/2021 <0.020     No results found for this or any previous visit.  POC Rapid COVID (no units)   Date Value   09/30/2021 Negative       Microbiology labs for the last week  Microbiology Results " (last 7 days)       Procedure Component Value Units Date/Time    Gram stain [[id number]] Collected: 07/28/25 0930    Order Status: Completed Specimen: Ascites Updated: 07/28/25 1726     GRAM STAIN Rare WBC seen      No organisms seen    Culture, Anaerobic [[id number]] Collected: 07/28/25 0930    Order Status: Sent Specimen: Ascites Updated: 07/28/25 1515    Aerobic culture [[id number]] Collected: 07/28/25 0930    Order Status: Sent Specimen: Ascites Updated: 07/28/25 1515    Blood culture [[id number]]  (Normal) Collected: 07/28/25 0113    Order Status: Completed Specimen: Blood from Peripheral, Antecubital, Right Updated: 07/28/25 1301     Blood Culture No Growth After 6 Hours    Blood culture [[id number]]  (Normal) Collected: 07/28/25 0128    Order Status: Completed Specimen: Blood from Peripheral, Forearm, Right Updated: 07/28/25 1301     Blood Culture No Growth After 6 Hours    Blood culture [[id number]] Collected: 07/28/25 0128    Order Status: Sent Specimen: Blood from Peripheral, Antecubital, Right             Reviewed and noted in plan where applicable- Please see chart for full lab data.    Lines/Drains:  Peripheral IV 07/28/25 0143 22 G Anterior;Proximal;Right Forearm (Active)   Site Assessment Clean;Intact;Dry 07/28/25 0400   Line Status Infusing 07/28/25 0400   Number of days: 0       Imaging      Results for orders placed during the hospital encounter of 09/30/21    Echo    Interpretation Summary  · The left ventricle is normal in size with normal systolic function.  · The estimated ejection fraction is 60%. Normal wall motion  · Normal left ventricular diastolic function.  · Normal right ventricular size with normal right ventricular systolic function.  · Normal central venous pressure (3 mmHg).  · The estimated PA systolic pressure is 17 mmHg.      IR Paracentesis with Imaging  Narrative: EXAMINATION:  Ultrasound-guided paracentesis    Procedural Personnel    Attending physician(s): Jade Ferro  MD    Fellow physician(s): None    Resident physician(s): None    Advanced practice provider(s): Socorro Bautista, AMARI, EARLENEP    Pre-procedure diagnosis: Ascites    Post-procedure diagnosis: Same    Complications: No immediate complications.    CLINICAL HISTORY:  Recurrent Ascites    TECHNIQUE:  - Ultrasound-guided paracentesis    FINDINGS:  Pre-procedure    Consent: Informed consent for the procedure was obtained and time-out was performed prior to the procedure.    Preparation: The site was prepared and draped using maximal sterile barrier technique including cutaneous antisepsis.    Anesthesia/sedation    Level of anesthesia/sedation: No sedation    Anesthesia/sedation administered by: Not applicable    Total intra-service sedation time (minutes): 0    Limited abdominal ultrasound    Limited abdominal ultrasound was performed.    Moderate ascites. A safe window for paracentesis was identified.    Paracentesis    Local anesthesia was administered. The peritoneal cavity was accessed on the right lower quadrant and fluid return confirmed position. Ascites was drained.    Paracentesis access technique: Real-time ultrasound guidance    Catheter placed: 5F Yueh    Closure    The catheter was removed. A sterile bandage was applied.    Post-drainage ultrasound: Not performed    Additional Details    Additional description of procedure: None    Equipment details: None    Specimens removed: Abdominal fluid    Estimated blood loss (mL): Less than 10    Standardized report: [email or web address]    Attestation    Signer name: Jade Ferro MD    I attest that I reviewed the stored images and agree with the report as written.  Impression: Ultrasound-guided paracentesis with drainage of 1500 mL of serous fluid.    _______________________________________________________________    Electronically signed by resident: Socorro Bautista NP  Date:    07/28/2025  Time:    12:46      Labs, Imaging, EKG and Diagnostic results from  7/28/2025 were reviewed.    Medications:  Medication list was reviewed and changes noted under Assessment/Plan.  Medications Ordered Prior to Encounter[1]  Scheduled Medications:  Current Facility-Administered Medications   Medication Dose Route Frequency    buPROPion  100 mg Oral BID    gabapentin  600 mg Oral BID    lactulose  10 g Oral BID    mupirocin   Nasal BID    piperacillin-tazobactam (Zosyn) IV (PEDS and ADULTS) (extended infusion is not appropriate)  4.5 g Intravenous Q8H     PRN:   Current Facility-Administered Medications:     acetaminophen, 650 mg, Oral, Q4H PRN    dextrose 50%, 12.5 g, Intravenous, PRN    dextrose 50%, 25 g, Intravenous, PRN    glucagon (human recombinant), 1 mg, Intramuscular, PRN    glucose, 16 g, Oral, PRN    glucose, 24 g, Oral, PRN    melatonin, 6 mg, Oral, Nightly PRN    naloxone, 0.4 mg, Intravenous, PRN    polyethylene glycol, 17 g, Oral, Daily PRN    prochlorperazine, 5 mg, Oral, Q6H PRN    senna-docusate, 1 tablet, Oral, BID PRN    sodium chloride 0.9%, 10 mL, Intravenous, Q12H PRN  Infusions:   Estimated Creatinine Clearance: 114.6 mL/min (based on SCr of 0.6 mg/dL).           Assessment & Plan  E coli bacteremia  Likely GI in origin given his cirrhosis/biliary obstruction although evidence of definitive source likely  - Continue zosyn.  - Repeat Bcx here and follow up OSH culture data.  - Consult hepatology and AES. May also need IR vs med consult team for paracentesis to evaluate ascitic fluid.  7/28 E coli bacteremia - Likely GI in origin given his cirrhosis/biliary obstruction.  Continue IV zosyn.Repeat BC pending. US Liver - cirrhotic liver morphology. and measures 14.1 cm in the right midclavicular line. There is no evidence of hepatic mass. There is hepatopedal flow in the main portal vein. Moderate ascites. common bile duct is mildly dilated to 9 mm in diameter. Echogenic material in the common bile duct. No evidence of gallstones. Gallbladder wall thickening is  nonspecific in the setting of ascites. Choledocholithiasis cannot be excluded on the basis of this exam. Consider MRCP.  Hepatology and AES consulted. IR consulted for paracentesis.K replaced     Sepsis 2/2 e coli bacteremia  Patient has sepsis without organ dysfunction secondary to e coli bacteremia, likely GI in origin. A review of systems was completed. Patient's sepsis is improving    Current Antibiotics    piperacillin-tazobactam (ZOSYN) 4.5 g in D5W 100 mL IVPB (MB+), Every 8 hours (non-standard times), Intravenous    Lactate  Recent Labs   Lab 07/25/25  1639 07/28/25  0113   LACTATE 1.4 1.5     Culture Data  Blood Cultures   Blood Culture   Date Value Ref Range Status   07/28/2025 No Growth After 6 Hours  Preliminary   07/28/2025 No Growth After 6 Hours  Preliminary      mSOFA  MSOFA Total  Min: 0   Min taken time: 07/28/25 0200  Max: 3   Max taken time: 07/28/25 1801    - Antibiotics as listed above  - Fluid resuscitation as follows: performed at OSH, no further needed at this time.  - Follow up culture data and evaluate for more definitive source (cholangitis vs SBP).  - Vasopressors were not needed  - The following services were consulted: hepatology, AES.    Biliary obstruction  Elevated LFTs, hyperbili noted initially 6/30 for which pt was admitted to OSH at that time with workup showing mild biliary duct dilation. MRCP performed at that time that did not show choledocholithiasis or significant ductal dilation and pt's LFTs remained elevated but bili downtrended. Pt discharged with GI follow up. However pt with persistent abd pain and jaundice as well as fever so represented with reoccurrence of worsening cholestatic pattern of labs. CBD dilation noted again on U/S at OSH 7/25 w/o evidence of choledocholithiasis. Found to have e coli bacteremia from uncertain source although likely GI source (cholangitis vs SBP vs other).    - NPO for now for possible procedure.   - Consult AES.  - Continue zosyn and  follow up OSH culture data.  - Daily labs to include CMP with fractionated bili, coags, GGT.    7/28 E coli bacteremia - Likely GI in origin given his cirrhosis/biliary obstruction.  Continue IV zosyn.Repeat BC pending. US Liver - cirrhotic liver morphology. and measures 14.1 cm in the right midclavicular line. There is no evidence of hepatic mass. There is hepatopedal flow in the main portal vein. Moderate ascites. common bile duct is mildly dilated to 9 mm in diameter. Echogenic material in the common bile duct. No evidence of gallstones. Gallbladder wall thickening is nonspecific in the setting of ascites. Choledocholithiasis cannot be excluded on the basis of this exam. Consider MRCP.  Hepatology and AES consulted. IR consulted for paracentesis.K replaced     Transaminitis  Likely multifactorial in setting of decompensated cirrhosis and biliary obstruction with infection. Further discussion and plan as in related problems.  Recent Labs     07/28/25  0113   BILITOT 9.0*   *   *   ALKPHOS 671*    monitor     Hyperbilirubinemia  Likely multifactorial in setting of decompensated cirrhosis and biliary obstruction with infection. Further discussion and plan as in related problems.   Patients liver enzymes  elevated.   Recent Labs     07/28/25  0113   BILITOT 9.0*   *   *   ALKPHOS 671*   monitor     Decompensated cirrhosis  Co-morbidities are present and inclusive of ascites, portal hypertension, malnutrition, anemia/pancytopenia, and anticoagulation.  MELD-Na score calculated; MELD 3.0: 20 at 7/28/2025  1:13 AM  MELD-Na: 17 at 7/28/2025  1:13 AM  Calculated from:  Serum Creatinine: 0.6 mg/dL (Using min of 1 mg/dL) at 7/28/2025  1:13 AM  Serum Sodium: 138 mmol/L (Using max of 137 mmol/L) at 7/28/2025  1:13 AM  Total Bilirubin: 9 mg/dL at 7/28/2025  1:13 AM  Serum Albumin: 2 g/dL at 7/28/2025  1:13 AM  INR(ratio): 1.2 at 7/28/2025  1:13 AM  Age at listing (hypothetical): 65 years  Sex: Male  at 7/28/2025  1:13 AM      Continue chronic meds. Etiology thought to be likely ETOH although with also known hx of UC. NAIF checked at OSH which was negative. Will avoid any hepatotoxic meds, and monitor CBC/CMP/INR for synthetic function. Consult hepatology and AES.  Ulcerative colitis  Followed by GI outpatient, on mesalamine.    Plan  - Hold mesalamine.    HTN (hypertension)  Patient's blood pressure range in the last 24 hours was: BP  Min: 116/69  Max: 155/82.The patient's inpatient anti-hypertensive regimen is listed below:  Current Antihypertensives       Plan  - Hold home regimen for now. Monitor and adjust PRN.    HLD (hyperlipidemia)    - Hold statin in setting of elevated LFTs.    Alcohol use  Pt reports he has been trying to quit over the last 2 yrs and had intermittent sobriety. He states he last drank in 04/2025. He denies any history of alcohol withdrawal.    Plan  - CIWA q4 for now, likely able to discontinue pending stability. Likely low risk for withdrawal at this point.  - PETH ordered.  - Continue to  on and encourage cessation.    Normocytic anemia  Likely in setting of known liver dz and probable nutritional deficiencies. Most recent hemoglobin and hematocrit are listed below.  Recent Labs     07/28/25 0113   HGB 11.0*   HCT 34.3*     Plan  - Monitor serial CBC: Daily  - Order iron studies and vitamin levels for evaluation.   - Transfuse PRBC if patient becomes hemodynamically unstable, symptomatic or H/H drops below 7/21.  - Patient has not received any PRBC transfusions to date  - Patient's anemia is currently stable  Thrombocytosis  Likely reactive in setting of infection/inflammation with ongoing GI issues. Continue to monitor.    Recent Labs   Lab 07/28/25 0113   *   monitor   Depression    - Continue home wellbutrin.      Hypokalemia  Patient's most recent potassium results are listed below.   Recent Labs     07/28/25 0113   K 3.2*     - Replete potassium per protocol  -  Monitor potassium Daily  Leucocytosis  Leucocytosis  Patient with leucocytosis   Recent Labs   Lab 07/28/25  0113   WBC 13.61*     . Afebrile. BCX 2 pending . likely secondary to sepsis.    VTE Risk Mitigation (From admission, onward)           Ordered     IP VTE HIGH RISK PATIENT  Once         07/28/25 0328     Place sequential compression device  Until discontinued         07/28/25 0328     Reason for No Pharmacological VTE Prophylaxis  Once        Comments: Hold for possible procedure.   Question:  Reasons:  Answer:  Physician Provided (leave comment)    07/28/25 0328     Place sequential compression device  Until discontinued         07/28/25 0052                    Discharge Planning   NELLY: 8/4/2025     Code Status: Full Code   Medical Readiness for Discharge Date:   Discharge Plan A: Home with family                        Lake Jones MD  Department of Hospital Medicine   [facility] - Transplant Stepdown         [1]   No current facility-administered medications on file prior to encounter.     Current Outpatient Medications on File Prior to Encounter   Medication Sig Dispense Refill    atorvastatin (LIPITOR) 40 MG tablet Take 1 tablet (40 mg total) by mouth once daily. 90 tablet 3    buPROPion (WELLBUTRIN SR) 100 MG TBSR 12 hr tablet Take 100 mg by mouth 2 (two) times daily.      cholecalciferol, vitamin D3, (VITAMIN D3) 50 mcg (2,000 unit) Cap capsule Take 1 capsule (2,000 Units total) by mouth once daily. 90 capsule 3    gabapentin (NEURONTIN) 600 MG tablet Take 0.5 tablets (300 mg total) by mouth every evening. (Patient taking differently: Take 300 mg by mouth 3 (three) times daily.) 15 tablet 11    isosorbide mononitrate (IMDUR) 30 MG 24 hr tablet Take 1 tablet (30 mg total) by mouth once daily. 90 tablet 3    lisinopriL (PRINIVIL,ZESTRIL) 5 MG tablet Take 1 tablet (5 mg total) by mouth once daily. 90 tablet 3    mesalamine (DELZICOL) 400 mg cdti cdti capsule Take 2 capsules (800 mg total) by mouth 3  (three) times daily. 180 capsule 11    nicotine (NICODERM CQ) 7 mg/24 hr Place 1 patch onto the skin once daily. 30 patch 3    tiZANidine (ZANAFLEX) 4 MG tablet Take 4 mg by mouth every 8 (eight) hours as needed.

## 2025-07-28 NOTE — ASSESSMENT & PLAN NOTE
Elevated LFTs, hyperbili noted initially 6/30 for which pt was admitted to OSH at that time with workup showing mild biliary duct dilation. MRCP performed at that time that did not show choledocholithiasis or significant ductal dilation and pt's LFTs remained elevated but bili downtrended. Pt discharged with GI follow up. However pt with persistent abd pain and jaundice as well as fever so represented with reoccurrence of worsening cholestatic pattern of labs. CBD dilation noted again on U/S at OSH 7/25 w/o evidence of choledocholithiasis. Found to have e coli bacteremia from uncertain source although likely GI source (cholangitis vs SBP vs other).    Plan  - NPO for now for possible procedure.   - Consult AES.  - Continue zosyn and follow up OSH culture data.  - Daily labs to include CMP with fractionated bili, coags, GGT.

## 2025-07-28 NOTE — ASSESSMENT & PLAN NOTE
Leucocytosis  Patient with leucocytosis   Recent Labs   Lab 07/28/25  0113   WBC 13.61*     . Afebrile. BCX 2 pending . likely secondary to sepsis.

## 2025-07-28 NOTE — ASSESSMENT & PLAN NOTE
Likely GI in origin given his cirrhosis/biliary obstruction although evidence of definitive source likely    Plan  - Continue zosyn.  - Repeat Bcx here and follow up OSH culture data.  - Consult hepatology and AES. May also need IR vs med consult team for paracentesis to evaluate ascitic fluid.

## 2025-07-28 NOTE — ASSESSMENT & PLAN NOTE
Patient's most recent potassium results are listed below.   Recent Labs     07/28/25  0113   K 3.2*     Plan  - Replete potassium per protocol  - Monitor potassium Daily

## 2025-07-28 NOTE — ANESTHESIA PREPROCEDURE EVALUATION
[facility]  Anesthesia Pre-Operative Evaluation         Patient Name: Eric Ma  YOB: 1960  MRN: [id number]    SUBJECTIVE:     Pre-operative evaluation for Procedure(s) (LRB):  ERCP (ENDOSCOPIC RETROGRADE CHOLANGIOPANCREATOGRAPHY) (N/A)     07/28/2025    Eric Ma is a 65 y.o. male w/ a significant PMHx of history of alcohol use disorder (quit 04/2025, intermittently sober over the last 2yrs), decompensated cirrhosis, UC, HTN, and depression who presented to [facility] 7/28/25 as a transfer for evaluation of biliary obstruction and decompensated cirrhosis with sepsis 2/2 e coli bacteremia concerning for ascending cholangitis.     Pt initially dx with cirrhosis thought to be alcoholic in nature in 04/2024 while following with his GI outpatient. Elevated LFTs, hyperbili noted initially 6/30 for which pt was admitted to OSH at that time with workup showing mild biliary duct dilation. MRCP performed at that time that did not show choledocholithiasis or significant ductal dilation and pt's LFTs remained elevated but bili downtrended. Pt discharged with GI follow up. However pt with persistent abd pain, distention, and jaundice as well as fever/chills so represented with reoccurrence of worsening cholestatic pattern of labs as well as elevated WBC to 18k and procal 1.2. CBD dilation noted again on U/S as well as moderate ascites at OSH 7/25 w/o evidence of choledocholithiasis. Pt discussed with GI at [facility] who recommended transfer for evaluation with hepatology and other services. Pt also found to have e coli bacteremia during workup while awaiting transfer for which he was started on zosyn with improvement in WBC and procal as well as symptomatically.    At time of admit evaluation, pt reports that he overall feels improved. His fever/chills and abd pain have improved/nearly resolved. His abd distention remains stable. He notes some mild BL LE edema that he states occurred during  transport. He reports that he has chronic left sided chest/rib soreness from a prior crush injury which required hardware implantation which is stable. He otherwise denies URI sxs, cough, dyspnea, hematochezia/melena, constipation, diarrhea, nausea, vomiting, decreased UOP, dysuria, dizziness, syncope, confusion.      Patient now presents for the above procedure(s).    Echo Summary  Results for orders placed during the hospital encounter of 09/30/21    Echo    Interpretation Summary  · The left ventricle is normal in size with normal systolic function.  · The estimated ejection fraction is 60%. Normal wall motion  · Normal left ventricular diastolic function.  · Normal right ventricular size with normal right ventricular systolic function.  · Normal central venous pressure (3 mmHg).  · The estimated PA systolic pressure is 17 mmHg.       Prev airway:     Intubation     Date/Time: 8/22/2023 4:19 PM     Performed by: Amarjit Peres CRNA  Authorized by: David Hodge MD    Intubation:     Induction:  Intravenous    Intubated:  Postinduction    Mask Ventilation:  Easy with oral airway    Attempts:  1    Attempted By:  Student    Method of Intubation:  Video laryngoscopy    Blade:  Pettit 3    Laryngeal View Grade: Grade I - full view of cords      Difficult Airway Encountered?: No      Complications:  None    Airway Device:  Oral endotracheal tube    Airway Device Size:  8.0    Style/Cuff Inflation:  Cuffed (inflated to minimal occlusive pressure)    Inflation Amount (mL):  7    Tube secured:  21    Secured at:  The lips    Placement Verified By:  Capnometry and Revisualization with laryngoscopy    DIFFICULT INTUBATION DESCRIPTOR: large beard.    Findings Post-Intubation:  BS equal bilateral and atraumatic/condition of teeth unchanged       LDA:  Peripheral IV 07/28/25 0143 22 G Anterior;Proximal;Right Forearm (Active)   Site Assessment Clean;Dry;Intact 07/28/25 5861   Line Securement Device Secured with sutureless  device 07/28/25 0755   Extremity Assessment Distal to IV No warmth;No swelling;No redness;No abnormal discoloration 07/28/25 0755   Line Status Flushed 07/28/25 0755   Dressing Status Clean;Dry;Intact 07/28/25 0755   Dressing Intervention Integrity maintained 07/28/25 0755   Number of days: 0       Drips: None documented.      Problem List[1]    Review of patient's allergies indicates:  No Known Allergies    Current Inpatient Medications:   buPROPion  100 mg Oral BID    gabapentin  600 mg Oral BID    lactulose  10 g Oral BID    mupirocin   Nasal BID    piperacillin-tazobactam (Zosyn) IV (PEDS and ADULTS) (extended infusion is not appropriate)  4.5 g Intravenous Q8H    potassium chloride  40 mEq Oral Once       Medications Ordered Prior to Encounter[2]    Past Surgical History:   Procedure Laterality Date    ANGIOGRAM, CORONARY, WITH LEFT HEART CATHETERIZATION  10/01/2021    Procedure: Angiogram, Coronary, with Left Heart Cath;  Surgeon: Gabriele Reynolds MD;  Location: [facility] CATH LAB;  Service: Cardiology;;    APPENDECTOMY      CARPECTOMY Right 02/09/2023    Procedure: CARPECTOMY;  Surgeon: Rai Carroll MD;  Location: [facility] MAIN OR;  Service: Orthopedics;  Laterality: Right;  PRC    FRACTURE SURGERY      LAMINECTOMY, SPINE, MINIMALLY INVASIVE, POSTERIOR APPROACH N/A 8/22/2023    Procedure: L4-S1 Decompressive Lumbar Laminectomy;  Surgeon: Cynthia Velazquez MD;  Location: [facility] MAIN OR;  Service: Neurosurgery;  Laterality: N/A;       Social History:  Tobacco Use: High Risk (7/1/2025)    Received from [facility]    Patient History     Smoking Tobacco Use: Never     Smokeless Tobacco Use: Current     Passive Exposure: Never      Alcohol Use: Not on file        OBJECTIVE:     Vital Signs Range (Last 24H):  Temp:  [36.4 °C (97.6 °F)-36.8 °C (98.3 °F)]   Pulse:  [69-78]   Resp:  [17-18]   BP: (120-155)/(74-86)   SpO2:  [97 %-98 %]       Significant Labs:  Lab Results   Component Value Date    WBC 13.61 (H)  07/28/2025    HGB 11.0 (L) 07/28/2025    HCT 34.3 (L) 07/28/2025     (H) 07/28/2025    CHOL 167 10/01/2021    TRIG 47 10/01/2021    HDL 45 10/01/2021     (H) 07/28/2025     (H) 07/28/2025     07/28/2025    K 3.2 (L) 07/28/2025     07/28/2025    CREATININE 0.6 07/28/2025    BUN 15 07/28/2025    CO2 24 07/28/2025    TSH 2.940 10/01/2021    INR 1.2 07/28/2025    HGBA1C 5.2 10/19/2021       Diagnostic Studies: No relevant studies.    EKG:   Results for orders placed or performed during the hospital encounter of 08/11/23   EKG 12-lead    Collection Time: 08/11/23 12:42 PM    Narrative    Test Reason : M51.36,M54.50,M79.604,R20.0,R20.2,    Vent. Rate : 086 BPM     Atrial Rate : 086 BPM     P-R Int : 134 ms          QRS Dur : 092 ms      QT Int : 368 ms       P-R-T Axes : 080 066 071 degrees     QTc Int : 440 ms    Normal sinus rhythm  Normal ECG  When compared with ECG of 18-FEB-2022 09:09,  No significant change was found  Confirmed by Darien Vargas MD ([id number]) on 8/21/2023 2:38:21 AM    Referred By:             Confirmed By:Darien Vragas MD       2D ECHO:  TTE:  Results for orders placed or performed during the hospital encounter of 09/30/21   Echo   Result Value Ref Range    LV Diastolic Volume 104.00 cm3    LV Systolic Volume 47.50 cm3    IVS 0.99 0.6 - 1.1 cm    LVIDd 4.94 3.5 - 6.0 cm    LVIDs 3.47 2.1 - 4.0 cm    LVOT diameter 2.00 cm    LVOT peak VTI 13.80 cm    LVOT peak mario 0.88 m/s    PW 0.89 0.6 - 1.1 cm    TDI LATERAL 0.07 m/s    TDI SEPTAL 0.09 m/s    LA Vol (MOD) 43.30 cm3    LA size 3.10 cm    AV mean gradient 3 mmHg    Ao VTI 17.80 cm    Ao peak mario 1.08 m/s    Ao root annulus 1.96 cm    Sinus 3.64 cm    E wave deceleration time 156.00 ms    MV stenosis pressure 1/2 time 95.00 ms    MV stenosis pressure 1/2 time 95.00 ms    MV Peak A Mario 0.53 m/s    MV Peak E Mario 0.56 m/s    MV mean gradient 1 mmHg    MV VTI 25.90 cm    MV peak gradient 2 mmHg    RV S' 22.50 cm/s    TAPSE  2.30 cm    TR Max Mario 1.90 m/s    BSA 1.87 m2    LV LATERAL E/E' RATIO 8.00 m/s    LV SEPTAL E/E' RATIO 6.22 m/s    FS 30 28 - 44 %    LV mass 164.22 g    Left Ventricle Relative Wall Thickness 0.36 cm    AV valve area 2.43 cm2    AV Velocity Ratio 0.81     AV index (prosthetic) 0.78     MV valve area by continuity eq 1.67 cm2    E/A ratio 1.06     Mean e' 0.08 m/s    LVOT area 3.1 cm2    LVOT stroke volume 43.33 cm3    AV peak gradient 5 mmHg    E/E' ratio 7.00 m/s    LV Mass Index 88 g/m2    Triscuspid Valve Regurgitation Peak Gradient 14 mmHg    KARL (MOD) 23.3 mL/m2    Right Atrial Pressure (from IVC) 3 mmHg    EF 60 %    TV resting pulmonary artery pressure 17 mmHg    Narrative    · The left ventricle is normal in size with normal systolic function.  · The estimated ejection fraction is 60%. Normal wall motion  · Normal left ventricular diastolic function.  · Normal right ventricular size with normal right ventricular systolic   function.  · Normal central venous pressure (3 mmHg).  · The estimated PA systolic pressure is 17 mmHg.          MICHAELA:  No results found for this or any previous visit.    ASSESSMENT/PLAN:         Pre-op Assessment    I have reviewed the Patient Summary Reports.     I have reviewed the Nursing Notes.    I have reviewed the Medications.     Review of Systems  Anesthesia Hx:               Denies Personal Hx of Anesthesia complications.                    Social:  Alcohol Use, Smoker       Cardiovascular:     Hypertension   CAD                   Cardiovascular Symptoms: Angina       Coronary Artery Disease:                            Hypertension         Hepatic/GI:   PUD,   Liver Disease,        Peptic Ulcer Disease       Liver Disease        Psych:    depression                Physical Exam  General: Well nourished, Cooperative, Alert and Oriented    Airway:  Mallampati: II   Mouth Opening: Normal  TM Distance: Normal  Tongue: Normal  Neck ROM: Normal  ROM    Dental:  Intact    Chest/Lungs:  Normal Respiratory Rate    Heart:  Rate: Normal        Anesthesia Plan  Type of Anesthesia, risks & benefits discussed:    Anesthesia Type: Gen ETT, Gen Supraglottic Airway, Gen Natural Airway, MAC  Intra-op Monitoring Plan: Standard ASA Monitors  Post Op Pain Control Plan: multimodal analgesia and IV/PO Opioids PRN  Induction:  IV  Airway Plan: Direct and Video, Post-Induction  Informed Consent: Informed consent signed with the Patient and all parties understand the risks and agree with anesthesia plan.  All questions answered.   ASA Score: 3  Day of Surgery Review of History & Physical: H&P Update referred to the surgeon/provider.    Ready For Surgery From Anesthesia Perspective.     .           [1]   Patient Active Problem List  Diagnosis    Chest pain    Essential hypertension    Ulcerative colitis    Low vitamin D level    Low back pain radiating to right leg    Bradycardia    Transaminitis    Cigarette nicotine dependence without complication    Atherosclerosis of native coronary artery of native heart with unstable angina pectoris    Encounter for tobacco use cessation counseling    Tobacco use    Degenerative disc disease, lumbar    Scaphoid non-union advanced collapse, right    Numbness and tingling    Spinal stenosis of lumbar region with neurogenic claudication    Decompensated cirrhosis    HTN (hypertension)    HLD (hyperlipidemia)    Sepsis 2/2 e coli bacteremia    E coli bacteremia    Biliary obstruction    Hyperbilirubinemia    Alcohol use    Normocytic anemia    Thrombocytosis    Depression    Hypokalemia    Leucocytosis   [2]   No current facility-administered medications on file prior to encounter.     Current Outpatient Medications on File Prior to Encounter   Medication Sig Dispense Refill    atorvastatin (LIPITOR) 40 MG tablet Take 1 tablet (40 mg total) by mouth once daily. 90 tablet 3    buPROPion (WELLBUTRIN SR) 100 MG TBSR 12 hr tablet Take 100 mg by  mouth 2 (two) times daily.      cholecalciferol, vitamin D3, (VITAMIN D3) 50 mcg (2,000 unit) Cap capsule Take 1 capsule (2,000 Units total) by mouth once daily. 90 capsule 3    gabapentin (NEURONTIN) 600 MG tablet Take 0.5 tablets (300 mg total) by mouth every evening. (Patient taking differently: Take 300 mg by mouth 3 (three) times daily.) 15 tablet 11    isosorbide mononitrate (IMDUR) 30 MG 24 hr tablet Take 1 tablet (30 mg total) by mouth once daily. 90 tablet 3    lisinopriL (PRINIVIL,ZESTRIL) 5 MG tablet Take 1 tablet (5 mg total) by mouth once daily. 90 tablet 3    mesalamine (DELZICOL) 400 mg cdti cdti capsule Take 2 capsules (800 mg total) by mouth 3 (three) times daily. 180 capsule 11    nicotine (NICODERM CQ) 7 mg/24 hr Place 1 patch onto the skin once daily. 30 patch 3    tiZANidine (ZANAFLEX) 4 MG tablet Take 4 mg by mouth every 8 (eight) hours as needed.

## 2025-07-28 NOTE — CONSULTS
"Infectious Disease Consult Note:    Consulting Physician: Lake Jones MD     Reason for Consult: "E coli bacteremia"    Assessment/Plan:    Escherichia Coli Bacteremia  Blood Cx positive on 7/25  Follow-up blood cultures from 7/28  Follow-up ascitic  cultures  Continue Zosyn    Management discussed with primary team  Infectious Disease will continue to follow.  Thank you for the consult.  Please call or message with questions    Lalitha Thomas MD  Infectious Diseases Fellow  07/28/2025        Active Hospital Problems    Diagnosis  POA    *E coli bacteremia [R78.81, B96.20]  Yes    Decompensated cirrhosis [K72.90, K74.60]  Yes    HTN (hypertension) [I10]  Yes    HLD (hyperlipidemia) [E78.5]  Yes    Sepsis 2/2 e coli bacteremia [A41.9]  Yes    Biliary obstruction [K83.1]  Yes    Hyperbilirubinemia [E80.6]  Yes    Alcohol use [F10.90]  Yes    Normocytic anemia [D64.9]  Yes    Thrombocytosis [D75.839]  Yes    Depression [F32.A]  Yes    Hypokalemia [E87.6]  Yes    Leucocytosis [D72.829]  Unknown    Transaminitis [R74.01]  Yes    Ulcerative colitis [K51.90]  Yes      Resolved Hospital Problems   No resolved problems to display.     Chief Complaint: Fevers and Abdominal Pain    History of Present Illness:  Patient is a 65 y.o. year old male with cirrhosis and UC presenting for fatigue, jaundice and large abdomen. He was originally admitted to an OSH at the end of June. He was discharged with GI follow up. He continued to have abdominal pain, distention and jaundice. He presented to a clinic multiple times. He then developed fevers and chills and presented to an outside ED. He was found to be septic with a worsening cholestatic pattern of labs. He was transferred for hepatology. He was found to also have E coli bacteremia was was started on zosyn. He states since starting the antibiotics that he feels much better. His abdomen felt better after a paracentesis.   He has had UC since the 80s. It has been well " controlled on mesalamine. The last time he had a flare was when this was stopped. He gets a colonoscopy every other year. He was told that his colon looked different this time and will now get colonoscopies every year. He had one recent EGD. He was told that it showed nothing.     Review of Systems   Constitutional:  Positive for chills and fever.   Eyes:  Negative for blurred vision.   Respiratory:  Negative for cough and shortness of breath.    Cardiovascular:  Negative for chest pain.   Gastrointestinal:  Positive for abdominal pain. Negative for diarrhea and melena.   Genitourinary:  Negative for urgency.   Skin:  Negative for rash.   Neurological:  Negative for sensory change and headaches.        Past Medical History:  Past Medical History:   Diagnosis Date    Back pain     Hypertension     Ulcerative colitis        Past Surgical History:  Past Surgical History:   Procedure Laterality Date    ANGIOGRAM, CORONARY, WITH LEFT HEART CATHETERIZATION  10/01/2021    Procedure: Angiogram, Coronary, with Left Heart Cath;  Surgeon: Gabriele Reynolds MD;  Location: [facility] CATH LAB;  Service: Cardiology;;    APPENDECTOMY      CARPECTOMY Right 02/09/2023    Procedure: CARPECTOMY;  Surgeon: Rai Carroll MD;  Location: [facility] MAIN OR;  Service: Orthopedics;  Laterality: Right;  PRC    FRACTURE SURGERY      LAMINECTOMY, SPINE, MINIMALLY INVASIVE, POSTERIOR APPROACH N/A 8/22/2023    Procedure: L4-S1 Decompressive Lumbar Laminectomy;  Surgeon: Cynthia Velazquez MD;  Location: [facility] MAIN OR;  Service: Neurosurgery;  Laterality: N/A;       Family History:  Family History   Problem Relation Name Age of Onset    Breast cancer Mother      Hypertension Mother      Hearing loss Mother      Arthritis Mother      Heart disease Father      Hypertension Father      Hearing loss Father      Arthritis Father      Drug abuse Sister      Arthritis Sister      Early death Brother      Drug abuse Brother      Diabetes Brother      Cancer  Brother      Arthritis Brother           Social History:  Social History[1]    Allergies:  Review of patient's allergies indicates:  No Known Allergies    Pertinent Medications:  Antibiotics:   Antibiotics (From admission, onward)      Start     Stop Route Frequency Ordered    07/28/25 0900  mupirocin 2 % ointment         08/02/25 0859 Nasl 2 times daily 07/28/25 0149    07/28/25 0300  piperacillin-tazobactam (ZOSYN) 4.5 g in D5W 100 mL IVPB (MB+)         -- IV Every 8 hours (non-standard times) 07/28/25 0148            Physical Exam:  VS (24h):   Vitals:    07/28/25 0814   BP: (!) 152/86   Pulse: 72   Resp: 18   Temp: 98.2 °F (36.8 °C)     Temp:  [97.6 °F (36.4 °C)-98.3 °F (36.8 °C)]       Physical Exam  Vitals reviewed.   Constitutional:       General: He is not in acute distress.     Appearance: He is not diaphoretic.      Comments: Temporal wasting present   HENT:      Nose: Nose normal.      Mouth/Throat:      Mouth: Mucous membranes are moist.   Eyes:      General: Scleral icterus present.   Pulmonary:      Effort: Pulmonary effort is normal. No respiratory distress.   Chest:      Comments: Scar from previous surgery  Abdominal:      General: There is distension.      Palpations: Abdomen is soft.      Tenderness: There is no guarding.   Skin:     General: Skin is warm.      Coloration: Skin is jaundiced.   Neurological:      General: No focal deficit present.      Mental Status: He is alert and oriented to person, place, and time. Mental status is at baseline.      Cranial Nerves: No cranial nerve deficit.   Psychiatric:         Behavior: Behavior normal.          Labs:  CBC:   Lab Results   Component Value Date    WBC 13.61 (H) 07/28/2025    WBC 6.65 08/11/2023    WBC 8.56 10/02/2021    WBC 6.17 10/01/2021    WBC 6.44 09/30/2021    HGB 11.0 (L) 07/28/2025    HCT 34.3 (L) 07/28/2025    MCV 98 07/28/2025     (H) 07/28/2025       BMP:   Recent Labs   Lab 07/28/25  0113         K 3.2*       CO2 24   BUN 15   CREATININE 0.6   CALCIUM 8.7   MG 1.9       LFT:   Lab Results   Component Value Date     (H) 07/28/2025     (H) 07/28/2025     (H) 07/28/2025     (H) 07/28/2025    ALKPHOS 671 (H) 07/28/2025    BILITOT 9.0 (H) 07/28/2025         Microbiology x 7d:   Microbiology Results (last 7 days)       Procedure Component Value Units Date/Time    Aerobic culture [[id number]]     Order Status: Sent Specimen: Ascites     Culture, Anaerobic [[id number]]     Order Status: Sent Specimen: Ascites     Gram stain [[id number]]     Order Status: Sent Specimen: Ascites     Blood culture [[id number]] Collected: 07/28/25 0128    Order Status: Resulted Specimen: Blood from Peripheral, Forearm, Right Updated: 07/28/25 0620    Blood culture [[id number]] Collected: 07/28/25 0113    Order Status: Resulted Specimen: Blood from Peripheral, Antecubital, Right Updated: 07/28/25 0620    Blood culture [[id number]] Collected: 07/28/25 0128    Order Status: Sent Specimen: Blood from Peripheral, Antecubital, Right               Imaging:  All relevant imaging was reviewed              [1]   Social History  Socioeconomic History    Marital status: Legally    Tobacco Use    Smoking status: Never    Smokeless tobacco: Never   Substance and Sexual Activity    Alcohol use: Not Currently     Comment: not for a year    Drug use: Not Currently     Types: Marijuana    Sexual activity: Not Currently     Social Drivers of Health     Financial Resource Strain: Low Risk  (7/28/2025)    Overall Financial Resource Strain (CARDIA)     Difficulty of Paying Living Expenses: Not very hard   Food Insecurity: No Food Insecurity (7/28/2025)    Hunger Vital Sign     Worried About Running Out of Food in the Last Year: Never true     Ran Out of Food in the Last Year: Never true   Transportation Needs: No Transportation Needs (7/28/2025)    PRAPARE - Transportation     Lack of Transportation (Medical): No     Lack of  Transportation (Non-Medical): No   Stress: No Stress Concern Present (7/28/2025)    [employer] [place] [employer] - Occupational Stress Questionnaire     Feeling of Stress : Not at all   Housing Stability: Low Risk  (7/28/2025)    Housing Stability Vital Sign     Unable to Pay for Housing in the Last Year: No     Homeless in the Last Year: No

## 2025-07-28 NOTE — PLAN OF CARE
Paracentesis completed, pt tolerated well. No apparent distress noted. 1500 ml removed from RLQ, mepore applied CDI. Labs collected and sent. No albumin given per protocol.  Pt to be transferred back to [facility] via transport. Report called and given to inpatient RNDevika.

## 2025-07-28 NOTE — ASSESSMENT & PLAN NOTE
Likely multifactorial in setting of decompensated cirrhosis and biliary obstruction with infection. Further discussion and plan as in related problems.

## 2025-07-28 NOTE — ASSESSMENT & PLAN NOTE
Likely multifactorial in setting of decompensated cirrhosis and biliary obstruction with infection. Further discussion and plan as in related problems.  Recent Labs     07/28/25  0113   BILITOT 9.0*   *   *   ALKPHOS 671*    monitor

## 2025-07-28 NOTE — ASSESSMENT & PLAN NOTE
Likely reactive in setting of infection/inflammation with ongoing GI issues. Continue to monitor.

## 2025-07-28 NOTE — ASSESSMENT & PLAN NOTE
Co-morbidities are present and inclusive of ascites, portal hypertension, malnutrition, anemia/pancytopenia, and anticoagulation.  MELD-Na score calculated; MELD 3.0: 20 at 7/28/2025  1:13 AM  MELD-Na: 17 at 7/28/2025  1:13 AM  Calculated from:  Serum Creatinine: 0.6 mg/dL (Using min of 1 mg/dL) at 7/28/2025  1:13 AM  Serum Sodium: 138 mmol/L (Using max of 137 mmol/L) at 7/28/2025  1:13 AM  Total Bilirubin: 9 mg/dL at 7/28/2025  1:13 AM  Serum Albumin: 2 g/dL at 7/28/2025  1:13 AM  INR(ratio): 1.2 at 7/28/2025  1:13 AM  Age at listing (hypothetical): 65 years  Sex: Male at 7/28/2025  1:13 AM      Continue chronic meds. Etiology thought to be likely ETOH although with also known hx of UC. NAIF checked at OSH which was negative. Will avoid any hepatotoxic meds, and monitor CBC/CMP/INR for synthetic function. Consult hepatology and AES.

## 2025-07-28 NOTE — ASSESSMENT & PLAN NOTE
Likely reactive in setting of infection/inflammation with ongoing GI issues. Continue to monitor.    Recent Labs   Lab 07/28/25  0113   *   monitor

## 2025-07-28 NOTE — ASSESSMENT & PLAN NOTE
Likely in setting of known liver dz and probable nutritional deficiencies. Most recent hemoglobin and hematocrit are listed below.  Recent Labs     07/28/25  0113   HGB 11.0*   HCT 34.3*     Plan  - Monitor serial CBC: Daily  - Order iron studies and vitamin levels for evaluation.   - Transfuse PRBC if patient becomes hemodynamically unstable, symptomatic or H/H drops below 7/21.  - Patient has not received any PRBC transfusions to date  - Patient's anemia is currently stable

## 2025-07-28 NOTE — ASSESSMENT & PLAN NOTE
Elevated LFTs, hyperbili noted initially 6/30 for which pt was admitted to OSH at that time with workup showing mild biliary duct dilation. MRCP performed at that time that did not show choledocholithiasis or significant ductal dilation and pt's LFTs remained elevated but bili downtrended. Pt discharged with GI follow up. However pt with persistent abd pain and jaundice as well as fever so represented with reoccurrence of worsening cholestatic pattern of labs. CBD dilation noted again on U/S at OSH 7/25 w/o evidence of choledocholithiasis. Found to have e coli bacteremia from uncertain source although likely GI source (cholangitis vs SBP vs other).    - NPO for now for possible procedure.   - Consult AES.  - Continue zosyn and follow up OSH culture data.  - Daily labs to include CMP with fractionated bili, coags, GGT.    7/28 E coli bacteremia - Likely GI in origin given his cirrhosis/biliary obstruction.  Continue IV zosyn.Repeat BC pending. US Liver - cirrhotic liver morphology. and measures 14.1 cm in the right midclavicular line. There is no evidence of hepatic mass. There is hepatopedal flow in the main portal vein. Moderate ascites. common bile duct is mildly dilated to 9 mm in diameter. Echogenic material in the common bile duct. No evidence of gallstones. Gallbladder wall thickening is nonspecific in the setting of ascites. Choledocholithiasis cannot be excluded on the basis of this exam. Consider MRCP.  Hepatology and AES consulted. IR consulted for paracentesis.STEWART flores

## 2025-07-28 NOTE — NURSING
Patient arrived to the unit alert and oriented, vss, room air. Patient was able to ambulate from stretcher to bed safely without assistance. Patient oriented to room. Provider notified of the patient's arrival. Nonskid socks are on, bed low, wheels locked, call light in reach.

## 2025-07-28 NOTE — ASSESSMENT & PLAN NOTE
Likely GI in origin given his cirrhosis/biliary obstruction although evidence of definitive source likely  - Continue zosyn.  - Repeat Bcx here and follow up OSH culture data.  - Consult hepatology and AES. May also need IR vs med consult team for paracentesis to evaluate ascitic fluid.  7/28 E coli bacteremia - Likely GI in origin given his cirrhosis/biliary obstruction.  Continue IV zosyn.Repeat BC pending. US Liver - cirrhotic liver morphology. and measures 14.1 cm in the right midclavicular line. There is no evidence of hepatic mass. There is hepatopedal flow in the main portal vein. Moderate ascites. common bile duct is mildly dilated to 9 mm in diameter. Echogenic material in the common bile duct. No evidence of gallstones. Gallbladder wall thickening is nonspecific in the setting of ascites. Choledocholithiasis cannot be excluded on the basis of this exam. Consider MRCP.  Hepatology and AES consulted. IR consulted for paracentesis.STEWART flores

## 2025-07-28 NOTE — SUBJECTIVE & OBJECTIVE
Past Medical History:   Diagnosis Date    Back pain     Hypertension     Ulcerative colitis        Past Surgical History:   Procedure Laterality Date    ANGIOGRAM, CORONARY, WITH LEFT HEART CATHETERIZATION  10/01/2021    Procedure: Angiogram, Coronary, with Left Heart Cath;  Surgeon: Gabriele Reynolds MD;  Location: [facility] CATH LAB;  Service: Cardiology;;    APPENDECTOMY      CARPECTOMY Right 02/09/2023    Procedure: CARPECTOMY;  Surgeon: Rai Carroll MD;  Location: [facility] MAIN OR;  Service: Orthopedics;  Laterality: Right;  PRC    FRACTURE SURGERY      LAMINECTOMY, SPINE, MINIMALLY INVASIVE, POSTERIOR APPROACH N/A 8/22/2023    Procedure: L4-S1 Decompressive Lumbar Laminectomy;  Surgeon: Cynthia Velazquez MD;  Location: [facility] MAIN OR;  Service: Neurosurgery;  Laterality: N/A;       Review of patient's allergies indicates:  No Known Allergies    No current facility-administered medications on file prior to encounter.     Current Outpatient Medications on File Prior to Encounter   Medication Sig    atorvastatin (LIPITOR) 40 MG tablet Take 1 tablet (40 mg total) by mouth once daily.    buPROPion (WELLBUTRIN SR) 100 MG TBSR 12 hr tablet Take 100 mg by mouth 2 (two) times daily.    cholecalciferol, vitamin D3, (VITAMIN D3) 50 mcg (2,000 unit) Cap capsule Take 1 capsule (2,000 Units total) by mouth once daily.    gabapentin (NEURONTIN) 600 MG tablet Take 0.5 tablets (300 mg total) by mouth every evening. (Patient taking differently: Take 300 mg by mouth 3 (three) times daily.)    isosorbide mononitrate (IMDUR) 30 MG 24 hr tablet Take 1 tablet (30 mg total) by mouth once daily.    lisinopriL (PRINIVIL,ZESTRIL) 5 MG tablet Take 1 tablet (5 mg total) by mouth once daily.    mesalamine (DELZICOL) 400 mg cdti cdti capsule Take 2 capsules (800 mg total) by mouth 3 (three) times daily.    nicotine (NICODERM CQ) 7 mg/24 hr Place 1 patch onto the skin once daily.    tiZANidine (ZANAFLEX) 4 MG tablet Take 4 mg by mouth every 8  (eight) hours as needed.     Family History       Problem Relation (Age of Onset)    Arthritis Mother, Father, Sister, Brother    Breast cancer Mother    Cancer Brother    Diabetes Brother    Drug abuse Sister, Brother    Early death Brother    Hearing loss Mother, Father    Heart disease Father    Hypertension Mother, Father          Tobacco Use    Smoking status: Never    Smokeless tobacco: Never   Substance and Sexual Activity    Alcohol use: Not Currently     Comment: not for a year    Drug use: Not Currently     Types: Marijuana    Sexual activity: Not Currently       Review of Systems   Constitutional:  Positive for chills and fever.   HENT:  Negative for congestion, ear pain, rhinorrhea and sore throat.    Respiratory:  Negative for cough, shortness of breath and wheezing.    Cardiovascular:  Positive for leg swelling.        + for chronic chest wall soreness at site of prior procedure due to crush injury   Gastrointestinal:  Positive for abdominal distention. Negative for abdominal pain, blood in stool, constipation, diarrhea, nausea and vomiting.   Genitourinary:  Negative for decreased urine volume and dysuria.   Skin:  Positive for color change.   Neurological:  Negative for dizziness, syncope and light-headedness.   Psychiatric/Behavioral:  Negative for confusion.      Objective:     Vital Signs (Most Recent):  Temp: 97.6 °F (36.4 °C) (07/28/25 0022)  Pulse: 71 (07/28/25 0022)  Resp: 18 (07/28/25 0022)  BP: (!) 155/82 (07/28/25 0022)  SpO2: 97 % (07/28/25 0022) Vital Signs (24h Range):  Temp:  [97.6 °F (36.4 °C)] 97.6 °F (36.4 °C)  Pulse:  [69-92] 71  Resp:  [18-20] 18  SpO2:  [97 %] 97 %  BP: (120-155)/(76-82) 155/82     Weight: 66 kg (145 lb 7 oz)  Body mass index is 22.44 kg/m².     Physical Exam  Vitals reviewed.   Constitutional:       General: He is not in acute distress.  HENT:      Head: Normocephalic and atraumatic.      Nose: Nose normal.      Mouth/Throat:      Mouth: Mucous membranes are  moist.   Eyes:      General: Scleral icterus present.      Extraocular Movements: Extraocular movements intact.      Pupils: Pupils are equal, round, and reactive to light.   Cardiovascular:      Rate and Rhythm: Normal rate and regular rhythm.      Heart sounds: Normal heart sounds.   Pulmonary:      Effort: Pulmonary effort is normal. No respiratory distress.      Breath sounds: Normal breath sounds. No wheezing, rhonchi or rales.   Abdominal:      General: Abdomen is flat. Bowel sounds are normal. There is no distension.      Palpations: Abdomen is soft.      Tenderness: There is no abdominal tenderness. There is no guarding or rebound.   Musculoskeletal:      Cervical back: Normal range of motion and neck supple.      Right lower leg: Edema (1+ non-pitting edema) present.      Left lower leg: Edema (1+ non-pitting edema) present.   Skin:     General: Skin is warm and dry.      Coloration: Skin is jaundiced.   Neurological:      General: No focal deficit present.      Mental Status: He is alert and oriented to person, place, and time.              CRANIAL NERVES     CN III, IV, VI   Pupils are equal, round, and reactive to light.       Significant Labs: All pertinent labs within the past 24 hours have been reviewed. OSH labs reviewed.  Recent Lab Results       None            Significant Imaging: I have reviewed all pertinent imaging results/findings within the past 24 hours. OSH imaging review.

## 2025-07-28 NOTE — HOSPITAL COURSE
7/28 E coli bacteremia - Likely GI in origin given his cirrhosis/biliary obstruction.  Continue IV zosyn.Repeat BC pending. US Liver - cirrhotic liver morphology. and measures 14.1 cm in the right midclavicular line. There is no evidence of hepatic mass. There is hepatopedal flow in the main portal vein. Moderate ascites. common bile duct is mildly dilated to 9 mm in diameter. Echogenic material in the common bile duct. No evidence of gallstones. Gallbladder wall thickening is nonspecific in the setting of ascites. Choledocholithiasis cannot be excluded on the basis of this exam. Consider MRCP.  Hepatology and AES consulted - Plan for ERCP tomorrow. NPO at midnight. IR consulted for paracentesis.1500 ml removed. labs pending. K replaced . bilateral feet to be purple and cool to touch. denies pain or claudication. Neurovascular checks. arterial US LE ordered  7/29 no evidence of SBP . ERCP today. ID eval - continue IV zosyn . LE arterial US - Right Leg: Segmental pressures suggest medial calcinosis. PVR waveforms suggest no evidence of significant PAOD.   Left Leg: Segmental pressures suggest medial calcinosis. PVR waveforms suggest no evidence of significant PAOD.   7/30 EUS                   - Normal esophagus.                          - Normal stomach.                          - Normal examined duodenum.                          - Hyperechoic material consistent with sludge was                          visualized endosonographically in the lower third                          of the main bile duct.                          - Hyperechoic material consistent with sludge was                          visualized endosonographically in the gallbladder                          body.                          - No specimens collected.    Possible passed stone along with sludge . resumed diet   ERCP -single localized biliary stricture was found  in the lower third of the main bile yohannes s/p stent  Cells for cytology obtained  Lunch given, tolerated well in the lower third of the main duct.Repeat ERCP in 6 weeks to exchange stent.   BC x2 7/28 NGTD. TBI of 0 with a Great toe pressure of 0 mmHg is noted RLE but asymptomatic.vascular surgery  recommends outpatient follow up.  CA 19-9. ID eval - transition levoquin for a 7 day course ( end date 8/8/25). Discharge home today

## 2025-07-28 NOTE — CONSULTS
[facility]  Hepatology  Consult Note    Patient Name: Eric Ma  MRN: [id number]  Admission Date: 7/28/2025  Hospital Length of Stay: 0 days  Code Status: Full Code   Attending Provider: Lake Jones MD   Consulting Provider: Devang Bassett MD  Primary Care Physician: Adelita Hernandez NP  Principal Problem:E coli bacteremia    Inpatient consult to Hepatology  Consult performed by: eDvang Bassett MD  Consult ordered by: Lake Jones MD        Subjective:     HPI: Eric Ma is a 65 y.o. male with history of alcohol use disorder, decompensated cirrhosis, UC, HTN, HLD, who presents as transfer from OSH for hepatology evaluation in setting of elevated liver enzymes, ascites, and ecoli bacteremia. Patient initially diagnosed with cirrhosis likely 2/2 to alcohol use in April 2024 with outpatient GI doctor. However, on questioning, patient remembers being told in 90s that his liver function was decreased. Per patient his last drink was in April following his appointment with GI. Patient was initially admitted to OSH for elevated LFTs, hyperbili on 6/30. MRCP performed at that time did not show choledocholithiasis or significant ductal dilation. He was discharged and then represented to OSH on 7/25 for persistent abdominal pain, distention, jaundice, fevers/chills. His work up revealed an elevated WBC 18k, procal 1.2, elevated LFTs, and ecoli bacteremia. Abdominal US noted CBD dilation to 9mm with echogenic material in duct but w/o evidence of gallstones. He was initiated on IV Zosyn and transported to [facility].     Repeat labs at [facility] show improvement in WBC count to 13, Hgb 11, Plt 736, ferritin 1200, ID/INR 1.2/12.7, K3.2, 671 ALP, 2.0 albumin, 9.0 Tbili, AST//104, 230GGT, MELD 3.0: 20    Subjectively he overall feels improved with fever/chills and abdominal pain that is now fully resolved. He otherwise denies URI sxs, cough, dyspnea, hematochezia/melena, constipation, diarrhea,  nausea, vomiting, decreased UOP, dysuria, dizziness, syncope, confusion.     Paracentesis was performed by IR on 7/28, with 1500mL removed which was well tolerated.      Past Medical History:   Diagnosis Date    Back pain     Hypertension     Ulcerative colitis        Past Surgical History:   Procedure Laterality Date    ANGIOGRAM, CORONARY, WITH LEFT HEART CATHETERIZATION  10/01/2021    Procedure: Angiogram, Coronary, with Left Heart Cath;  Surgeon: Gabriele Reynolds MD;  Location: [facility] CATH LAB;  Service: Cardiology;;    APPENDECTOMY      CARPECTOMY Right 02/09/2023    Procedure: CARPECTOMY;  Surgeon: Rai Carroll MD;  Location: [facility] MAIN OR;  Service: Orthopedics;  Laterality: Right;  PRC    FRACTURE SURGERY      LAMINECTOMY, SPINE, MINIMALLY INVASIVE, POSTERIOR APPROACH N/A 8/22/2023    Procedure: L4-S1 Decompressive Lumbar Laminectomy;  Surgeon: Cynthia Velazquez MD;  Location: [facility] MAIN OR;  Service: Neurosurgery;  Laterality: N/A;       Family History   Problem Relation Name Age of Onset    Breast cancer Mother      Hypertension Mother      Hearing loss Mother      Arthritis Mother      Heart disease Father      Hypertension Father      Hearing loss Father      Arthritis Father      Drug abuse Sister      Arthritis Sister      Early death Brother      Drug abuse Brother      Diabetes Brother      Cancer Brother      Arthritis Brother         Social History[1]    Medications Ordered Prior to Encounter[2]    Review of patient's allergies indicates:  No Known Allergies    Review of Systems   Constitutional:  Negative for chills and fever.   Respiratory: Negative.     Gastrointestinal:  Positive for abdominal pain and nausea. Negative for constipation, diarrhea and vomiting.   Genitourinary:  Negative for dysuria.   Musculoskeletal: Negative.    Skin: Negative.    Neurological: Negative.    Psychiatric/Behavioral: Negative.          Objective:     Vitals:    07/28/25 0955   BP: 124/74   Pulse: 68   Resp: 16    Temp:          Constitutional:  not in acute distress and well developed  HENT: Head: Normal, normocephalic, atraumatic.  Eyes: conjunctiva clear and sclera icteric  Cardiovascular: regular rate and rhythm and no murmur  Respiratory: normal chest expansion & respiratory effort   and no accessory muscle use  GI: soft, non-tender, without masses or organomegaly and distended  Musculoskeletal: no muscular tenderness noted  Skin: jaundice present and mild edema  Neurological: alert, oriented x3  speech normal in context and clarity  no involuntary movements - tremors  Psychiatric: mood and affect are within normal limits, pt is a good historian; no memory problems were noted  Rectal: deferred    Significant Labs:  Recent Labs   Lab 07/28/25  0113   HGB 11.0*       Lab Results   Component Value Date    WBC 13.61 (H) 07/28/2025    HGB 11.0 (L) 07/28/2025    HCT 34.3 (L) 07/28/2025    MCV 98 07/28/2025     (H) 07/28/2025       Lab Results   Component Value Date     07/28/2025    K 3.2 (L) 07/28/2025     07/28/2025    CO2 24 07/28/2025    BUN 15 07/28/2025    CREATININE 0.6 07/28/2025    CALCIUM 8.7 07/28/2025    ANIONGAP 10 07/28/2025    ESTGFRAFRICA >60.0 10/02/2021    EGFRNONAA >60.0 10/02/2021       Lab Results   Component Value Date     (H) 07/28/2025     (H) 07/28/2025     (H) 07/28/2025     (H) 07/28/2025    ALKPHOS 671 (H) 07/28/2025    BILITOT 9.0 (H) 07/28/2025       Lab Results   Component Value Date    INR 1.2 07/28/2025    INR 0.97 08/11/2023    INR 1.19 10/01/2021       Significant Imaging:  Reviewed pertinent radiology findings.       Assessment/Plan:     Eric Ma is a 65 y.o. male with history of alcohol use disorder, decompensated cirrhosis, UC, HTN, HLD, who presents as transfer from OSH for hepatology evaluation in setting of elevated liver enzymes, ascites, and ecoli bacteremia. Given imaging findings of dilated CBD, there is concern for biliary  obstruction. Patient has never received a paracentesis in the past. Would like to rule out SBP as well given his Ecoli bacteremia. NAIF and AFP negative at OSH, but appropriate to obtain chronic liver disease laboratory workup.     Problem List:  Ecoli Bacteremia  Elevated liver enzymes  Decompensated alcohol cirrhosis    Recommendations:    -s/p paracentesis with IR, will f/u ascites labs  -ERCP planned for 7/29 with AES  -plan for chronic liver disease serological work up of autoimmune/viral etiologies   -continue IV Zosyn    Thank you for involving us in the care of Eric Ma. Please call with any additional questions, concerns or changes in the patient's clinical status. We will continue to follow.     Devang Bassett MD PGY-1  [facility]                  [1]   Social History  Socioeconomic History    Marital status: Legally    Tobacco Use    Smoking status: Never    Smokeless tobacco: Never   Substance and Sexual Activity    Alcohol use: Not Currently     Comment: not for a year    Drug use: Not Currently     Types: Marijuana    Sexual activity: Not Currently     Social Drivers of Health     Financial Resource Strain: Low Risk  (7/28/2025)    Overall Financial Resource Strain (CARDIA)     Difficulty of Paying Living Expenses: Not very hard   Food Insecurity: No Food Insecurity (7/28/2025)    Hunger Vital Sign     Worried About Running Out of Food in the Last Year: Never true     Ran Out of Food in the Last Year: Never true   Transportation Needs: No Transportation Needs (7/28/2025)    PRAPARE - Transportation     Lack of Transportation (Medical): No     Lack of Transportation (Non-Medical): No   Stress: No Stress Concern Present (7/28/2025)    [employer] [place] [employer] - Occupational Stress Questionnaire     Feeling of Stress : Not at all   Housing Stability: Low Risk  (7/28/2025)    Housing Stability Vital Sign     Unable to Pay for Housing in the Last Year: No      Homeless in the Last Year: No   [2]   No current facility-administered medications on file prior to encounter.     Current Outpatient Medications on File Prior to Encounter   Medication Sig Dispense Refill    atorvastatin (LIPITOR) 40 MG tablet Take 1 tablet (40 mg total) by mouth once daily. 90 tablet 3    buPROPion (WELLBUTRIN SR) 100 MG TBSR 12 hr tablet Take 100 mg by mouth 2 (two) times daily.      cholecalciferol, vitamin D3, (VITAMIN D3) 50 mcg (2,000 unit) Cap capsule Take 1 capsule (2,000 Units total) by mouth once daily. 90 capsule 3    gabapentin (NEURONTIN) 600 MG tablet Take 0.5 tablets (300 mg total) by mouth every evening. (Patient taking differently: Take 300 mg by mouth 3 (three) times daily.) 15 tablet 11    isosorbide mononitrate (IMDUR) 30 MG 24 hr tablet Take 1 tablet (30 mg total) by mouth once daily. 90 tablet 3    lisinopriL (PRINIVIL,ZESTRIL) 5 MG tablet Take 1 tablet (5 mg total) by mouth once daily. 90 tablet 3    mesalamine (DELZICOL) 400 mg cdti cdti capsule Take 2 capsules (800 mg total) by mouth 3 (three) times daily. 180 capsule 11    nicotine (NICODERM CQ) 7 mg/24 hr Place 1 patch onto the skin once daily. 30 patch 3    tiZANidine (ZANAFLEX) 4 MG tablet Take 4 mg by mouth every 8 (eight) hours as needed.

## 2025-07-28 NOTE — PLAN OF CARE
[facility] - Transplant Stepdown  Initial Discharge Assessment       Primary Care Provider: Adelita Hernandez NP    Admission Diagnosis: Alcoholic cirrhosis of liver with ascites [K70.31]  Alcoholic cirrhosis [K70.30]    Admission Date: 7/28/2025  Expected Discharge Date: 8/4/2025    Transition of Care Barriers: None    Payor: MEDICAID / Plan: MEDICA[place] / Product Type: Government /     Extended Emergency Contact Information  Primary Emergency Contact: Marielos Ma  Mobile Phone: [phone]  Relation: Daughter  Preferred language: English   needed? No  Secondary Emergency Contact: Rahel Ma  Mobile Phone: [phone]  Relation: Daughter  Preferred language: English   needed? No    Discharge Plan A: Home with family  Discharge Plan B: Home      [facility] [place] - [address] [address] [address]  Phone: [phone] Fax: [phone]      Initial Assessment (most recent)       Adult Discharge Assessment - 07/28/25 1156          Discharge Assessment    Assessment Type Discharge Planning Assessment     Confirmed/corrected address, phone number and insurance Yes     Confirmed Demographics Correct on Facesheet     Source of Information patient     Communicated NELLY with patient/caregiver Yes     People in Home alone     Do you expect to return to your current living situation? Yes     Do you have help at home or someone to help you manage your care at home? Yes     Who are your caregiver(s) and their phone number(s)? Marielos Ma (Daughter)  [phone] (Mobile)     Prior to hospitilization cognitive status: Alert/Oriented;No Deficits     Current cognitive status: Alert/Oriented;No Deficits     Walking or Climbing Stairs Difficulty no     Dressing/Bathing Difficulty no     Home Accessibility not wheelchair accessible     Home Layout Able to live on 1st floor     Equipment Currently Used at Home none     Readmission within 30  days? No     Patient currently being followed by outpatient case management? No     Do you currently have service(s) that help you manage your care at home? No     Do you take prescription medications? Yes     Do you have prescription coverage? Yes     Do you have any problems affording any of your prescribed medications? No     Is the patient taking medications as prescribed? yes     Who is going to help you get home at discharge? Marielos Ma (Daughter)  [phone] (Mobile)     How do you get to doctors appointments? car, drives self     Are you on dialysis? No     Do you take coumadin? No     Discharge Plan A Home with family     Discharge Plan B Home     DME Needed Upon Discharge  none     Discharge Plan discussed with: Patient     Transition of Care Barriers None        Financial Resource Strain    How hard is it for you to pay for the very basics like food, housing, medical care, and heating? Not very hard        Housing Stability    In the last 12 months, was there a time when you were not able to pay the mortgage or rent on time? No     At any time in the past 12 months, were you homeless or living in a shelter (including now)? No        Transportation Needs    In the past 12 months, has lack of transportation kept you from medical appointments or from getting medications? No     In the past 12 months, has lack of transportation kept you from meetings, work, or from getting things needed for daily living? No        Food Insecurity    Within the past 12 months, you worried that your food would run out before you got the money to buy more. Never true     Within the past 12 months, the food you bought just didn't last and you didn't have money to get more. Never true        Stress    Do you feel stress - tense, restless, nervous, or anxious, or unable to sleep at night because your mind is troubled all the time - these days? Not at all        Social Isolation    How often do you feel lonely or isolated from  those around you?  Never        Alcohol Use    Q1: How often do you have a drink containing alcohol? Patient declined     Q2: How many drinks containing alcohol do you have on a typical day when you are drinking? Patient declined     Q3: How often do you have six or more drinks on one occasion? Patient declined        Utilities    In the past 12 months has the electric, gas, oil, or water company threatened to shut off services in your home? No        Health Literacy    How often do you need to have someone help you when you read instructions, pamphlets, or other written material from your doctor or pharmacy? Never                   CM spoke with patient in Room 67702 at bedside. All information was verified on facesheet. Patient lives in a Mobile home alone. Patient states no assistance is needed. Patient can ambulate, drive, run errands, and complete ADL's independently. Patient does not use any DME or any in-home assistive equipment. Patient has not used Home Health previously. Patient is not on dialysis nor use Coumadin as a blood thinner. Patient takes medication as prescribed and has resources for all prescriptive needs. Patient will have help from daughter Marielos upon discharge. Marielos will provide transportation home. All Questions and concerns addressed and whiteboard updated with CM contact information. Will continue to follow for course of hospitalization.      Discharge Plan A and Plan B have been determined by review of patient's clinical status, future medical and therapeutic needs, and coverage/benefits for post-acute care in coordination with multidisciplinary team members.     Tadeo Thakur RN, BSN  Case Management  [phone]

## 2025-07-28 NOTE — H&P
Inpatient Radiology Pre-procedure Note    History of Present Illness:  Eric Ma is a 65 y.o. male who presents for ultrasound guided paracentesis.  Admission H&P reviewed.  Past Medical History:   Diagnosis Date    Back pain     Hypertension     Ulcerative colitis      Past Surgical History:   Procedure Laterality Date    ANGIOGRAM, CORONARY, WITH LEFT HEART CATHETERIZATION  10/01/2021    Procedure: Angiogram, Coronary, with Left Heart Cath;  Surgeon: Gabriele Reynolds MD;  Location: [facility] CATH LAB;  Service: Cardiology;;    APPENDECTOMY      CARPECTOMY Right 02/09/2023    Procedure: CARPECTOMY;  Surgeon: Rai Carroll MD;  [facility] MA[facility];  Service: Orthopedics;  Laterality: Right;  PRC    FRACTURE SURGERY      LAMINECTOMY, SPINE, MINIMALLY INVASIVE, POSTERIOR APPROACH N/A 8/22/2023    Procedure: L4-S1 Decompressive Lumbar Laminectomy;  Surgeon: Cynthia Velazquez MD;  Location: [facility] MAIN OR;  Service: Neurosurgery;  Laterality: N/A;       Review of Systems:   As documented in primary team H&P    Home Meds:   Prior to Admission medications    Medication Sig Start Date End Date Taking? Authorizing Provider   atorvastatin (LIPITOR) 40 MG tablet Take 1 tablet (40 mg total) by mouth once daily. 6/24/22 7/28/25 Yes Eimly Velarde MD   buPROPion (WELLBUTRIN SR) 100 MG TBSR 12 hr tablet Take 100 mg by mouth 2 (two) times daily. 1/19/23  Yes Provider, Historical   cholecalciferol, vitamin D3, (VITAMIN D3) 50 mcg (2,000 unit) Cap capsule Take 1 capsule (2,000 Units total) by mouth once daily. 6/24/22  Yes Emily Velarde MD   gabapentin (NEURONTIN) 600 MG tablet Take 0.5 tablets (300 mg total) by mouth every evening.  Patient taking differently: Take 300 mg by mouth 3 (three) times daily. 6/24/22 7/28/25 Yes Emily Velarde MD   isosorbide mononitrate (IMDUR) 30 MG 24 hr tablet Take 1 tablet (30 mg total) by mouth once daily. 6/24/22  Yes Emily Velarde MD   lisinopriL (PRINIVIL,ZESTRIL) 5 MG  tablet Take 1 tablet (5 mg total) by mouth once daily. 6/24/22  Yes Emily Velarde MD   mesalamine (DELZICOL) 400 mg cdti cdti capsule Take 2 capsules (800 mg total) by mouth 3 (three) times daily. 6/24/22  Yes Emily Velarde MD   nicotine (NICODERM CQ) 7 mg/24 hr Place 1 patch onto the skin once daily. 8/23/23   Harry Ramsay MD   tiZANidine (ZANAFLEX) 4 MG tablet Take 4 mg by mouth every 8 (eight) hours as needed. 3/2/23   Provider, Historical     Scheduled Meds:    buPROPion  100 mg Oral BID    gabapentin  600 mg Oral BID    lactulose  10 g Oral BID    mupirocin   Nasal BID    piperacillin-tazobactam (Zosyn) IV (PEDS and ADULTS) (extended infusion is not appropriate)  4.5 g Intravenous Q8H    potassium chloride  40 mEq Oral Once     Continuous Infusions:   PRN Meds:  Current Facility-Administered Medications:     acetaminophen, 650 mg, Oral, Q4H PRN    dextrose 50%, 12.5 g, Intravenous, PRN    dextrose 50%, 25 g, Intravenous, PRN    glucagon (human recombinant), 1 mg, Intramuscular, PRN    glucose, 16 g, Oral, PRN    glucose, 24 g, Oral, PRN    melatonin, 6 mg, Oral, Nightly PRN    naloxone, 0.4 mg, Intravenous, PRN    polyethylene glycol, 17 g, Oral, Daily PRN    prochlorperazine, 5 mg, Oral, Q6H PRN    senna-docusate, 1 tablet, Oral, BID PRN    sodium chloride 0.9%, 10 mL, Intravenous, Q12H PRN  Anticoagulants/Antiplatelets: no anticoagulation    Allergies: Review of patient's allergies indicates:  No Known Allergies  Sedation Hx: have not been any systemic reactions    Vitals:  Temp: 98.2 °F (36.8 °C) (07/28/25 0814)  Pulse: 70 (07/28/25 0909)  Resp: 17 (07/28/25 0909)  BP: (!) 146/79 (07/28/25 0909)  SpO2: 97 % (07/28/25 0909)     Physical Exam:  ASA: 3  Mallampati: n/a    General: no acute distress  Mental Status: alert and oriented to person, place and time  HEENT: normocephalic, atraumatic  Chest: unlabored breathing  Heart: regular heart rate  Abdomen: distended  Extremity: moves all  extremities    Plan: ultrasound guided paracentesis  Sedation Plan: local    AMARI Gibbons, FNP  Interventional Radiology  [phone] clinic

## 2025-07-28 NOTE — ASSESSMENT & PLAN NOTE
Pt reports he has been trying to quit over the last 2 yrs and had intermittent sobriety. He states he last drank in 04/2025. He denies any history of alcohol withdrawal.    Plan  - CIWA q4 for now, likely able to discontinue pending stability. Likely low risk for withdrawal at this point.  - PETH ordered.  - Continue to  on and encourage cessation.

## 2025-07-28 NOTE — ASSESSMENT & PLAN NOTE
Likely multifactorial in setting of decompensated cirrhosis and biliary obstruction with infection. Further discussion and plan as in related problems.   Patients liver enzymes  elevated.   Recent Labs     07/28/25  0113   BILITOT 9.0*   *   *   ALKPHOS 671*   monitor

## 2025-07-28 NOTE — PROGRESS NOTES
Called into patient's room to assess his feet. Daughter noted bottoms/balls of bilateral feet to be purple and cool to touch when changing his socks. RN also noted purple discoloration to above area. Patient denies any pain at rest or when ambulating. +1 BLE edema noted. Dr. Jones notified.     MD to bedside to assess. Neurovascular checks ordered.

## 2025-07-28 NOTE — ASSESSMENT & PLAN NOTE
Patient's blood pressure range in the last 24 hours was: BP  Min: 116/69  Max: 155/82.The patient's inpatient anti-hypertensive regimen is listed below:  Current Antihypertensives       Plan  - Hold home regimen for now. Monitor and adjust PRN.

## 2025-07-28 NOTE — HPI
Eric Ma is 65 y.o. male with history of alcohol use disorder (quit 04/2025, intermittently sober over the last 2yrs), decompensated cirrhosis, UC, HTN, HLD, and depression who presents as a transfer for evaluation of biliary obstruction and decompensated cirrhosis with sepsis 2/2 e coli bacteremia concerning for ascending cholangitis.     Pt initially dx with cirrhosis thought to be alcoholic in nature in 04/2024 while following with his GI outpatient. Elevated LFTs, hyperbili noted initially 6/30 for which pt was admitted to OSH at that time with workup showing mild biliary duct dilation. MRCP performed at that time that did not show choledocholithiasis or significant ductal dilation and pt's LFTs remained elevated but bili downtrended. Pt discharged with GI follow up. However pt with persistent abd pain, distention, and jaundice as well as fever/chills so represented with reoccurrence of worsening cholestatic pattern of labs as well as elevated WBC to 18k and procal 1.2. CBD dilation noted again on U/S as well as moderate ascites at OSH 7/25 w/o evidence of choledocholithiasis. Pt discussed with GI at [facility] who recommended transfer for evaluation with hepatology and other services. Pt also found to have e coli bacteremia during workup while awaiting transfer for which he was started on zosyn with improvement in WBC and procal as well as symptomatically.    At time of admit evaluation, pt reports that he overall feels improved. His fever/chills and abd pain have improved/nearly resolved. His abd distention remains stable. He notes some mild BL LE edema that he states occurred during transport. He reports that he has chronic left sided chest/rib soreness from a prior crush injury which required hardware implantation which is stable. He otherwise denies URI sxs, cough, dyspnea, hematochezia/melena, constipation, diarrhea, nausea, vomiting, decreased UOP, dysuria, dizziness, syncope, confusion.

## 2025-07-28 NOTE — CONSULTS
[facility] Consult Note    Attending: Lake Jones MD   Admit Date: 7/28/2025  Today's Date: 07/28/2025    SUBJECTIVE:     HPI:  Eric Ma is a 65 year old male for whom AES is consulted for elevated liver enzymes and concern for biliary obstruction. He has a history of alcohol use disorder (last drink 4/2025), decompensated alcohol related cirrhosis associated with ascites, Ulcerative Colitis (currently on Mesalamine/delzicol). History provided by patient and chart review.     He was recently admitted to [facility] at the end of June for elevated liver enzymes. Labs notable for T bili 7.4, AST//130, . MRCP without evidence of choledocholithiasis. His liver enzymes improved and he was discharged home. He presented to [facility] on 7/25 for generalized abdominal pain. US RUQ showed mild dilation of the CBD to 9 mm wih echogenic material in the CBD. Of note blood cx positive for E.coli. He was started on Zosyn and ultimately [place] [facility] [place]. Most recent labs notable for WBC 13.61, Hgb 11, INR 1.2, T bili 9.0 (direct 6.1), , AST//104, CRP 75, procal 0.47, otherwise normal LA, serum alcohol and UA. On my assessment, he does report generalized abdominal pain.     Review of patient's allergies indicates:  No Known Allergies    Past Medical History:   Diagnosis Date    Back pain     Hypertension     Ulcerative colitis      Past Surgical History:   Procedure Laterality Date    ANGIOGRAM, CORONARY, WITH LEFT HEART CATHETERIZATION  10/01/2021    Procedure: Angiogram, Coronary, with Left Heart Cath;  Surgeon: Gabriele Reynolds MD;  Location: [facility] CATH LAB;  Service: Cardiology;;    APPENDECTOMY      CARPECTOMY Right 02/09/2023    Procedure: CARPECTOMY;  Surgeon: Rai Carroll MD;  Location: [facility] MAIN OR;  Service: Orthopedics;  Laterality: Right;  PRC    FRACTURE SURGERY      LAMINECTOMY, SPINE, MINIMALLY INVASIVE, POSTERIOR APPROACH  "N/A 8/22/2023    Procedure: L4-S1 Decompressive Lumbar Laminectomy;  Surgeon: Cynthia Velazquez MD;  Location: [facility] MAIN OR;  Service: Neurosurgery;  Laterality: N/A;     Family History   Problem Relation Name Age of Onset    Breast cancer Mother      Hypertension Mother      Hearing loss Mother      Arthritis Mother      Heart disease Father      Hypertension Father      Hearing loss Father      Arthritis Father      Drug abuse Sister      Arthritis Sister      Early death Brother      Drug abuse Brother      Diabetes Brother      Cancer Brother      Arthritis Brother       Social History[1]    All home medications reviewed.    OBJECTIVE:     Vital Signs Trends/History Reviewed  Vitals:    07/28/25 0020 07/28/25 0022 07/28/25 0235 07/28/25 0444   BP:  (!) 155/82  128/74   BP Location:  Right arm     Patient Position:  Sitting     Pulse:  71 78 75   Resp:  18     Temp:  97.6 °F (36.4 °C)  98.3 °F (36.8 °C)   TempSrc:  Oral  Oral   SpO2:  97%  98%   Weight: 66 kg (145 lb 7 oz)      Height: 5' 7.5" (1.715 m)          Physical Exam  Vitals reviewed.   Constitutional:       General: He is not in acute distress.  Eyes:      General: Scleral icterus present.   Pulmonary:      Effort: Pulmonary effort is normal.   Abdominal:      General: There is distension (mild).      Palpations: Abdomen is soft.      Tenderness: There is no abdominal tenderness.   Skin:     Coloration: Skin is jaundiced.   Neurological:      Mental Status: He is alert.         Laboratory: All labs results within last 24 hours have been reviewed.     Imaging: All imaging results within the last 24 hours have been reviewed.       Scheduled Medications:    buPROPion  100 mg Oral BID    gabapentin  600 mg Oral BID    lactulose  10 g Oral BID    mupirocin   Nasal BID    piperacillin-tazobactam (Zosyn) IV (PEDS and ADULTS) (extended infusion is not appropriate)  4.5 g Intravenous Q8H    potassium chloride  40 mEq Oral Once       PRN Medications:     Current " Facility-Administered Medications:     acetaminophen, 650 mg, Oral, Q4H PRN    dextrose 50%, 12.5 g, Intravenous, PRN    dextrose 50%, 25 g, Intravenous, PRN    glucagon (human recombinant), 1 mg, Intramuscular, PRN    glucose, 16 g, Oral, PRN    glucose, 24 g, Oral, PRN    melatonin, 6 mg, Oral, Nightly PRN    naloxone, 0.4 mg, Intravenous, PRN    polyethylene glycol, 17 g, Oral, Daily PRN    prochlorperazine, 5 mg, Oral, Q6H PRN    senna-docusate, 1 tablet, Oral, BID PRN    sodium chloride 0.9%, 10 mL, Intravenous, Q12H PRN    ASSESSMENT:    65 year old male with alcohol use disorder (last drink 4/2025), decompensated alcohol related cirrhosis associated with ascites, Ulcerative Colitis (currently on Mesalamine/delzicol) who initially presented to OSH with generalized abdominal pain, found to have elevated liver enzymes (T bili 12.3, AST//106, ), E.coli bacteremia and imaging findings concerning for dilated CBD with echogenic material, [place] [facility] [place]. AES consulted given concern for biliary obstruction. Most recent labs notable for WBC 13.61, Hgb 11, INR 1.2, T bili 9.0 (direct 6.1), , AST//104, CRP 75, procal 0.47, otherwise normal [place], serum alcohol and UA.     Problem List:  E.coli bacteremia  Abnormal imaging findings  Elevated liver enzymes  Decompensated alcohol related cirrhosis  Alcohol use disorder    RECOMMENDATIONS:    -NPO at midnight; plan for ERCP on 7/29. Okay for diet today from AES standpoint.  -Agree with abx for E.coli bacteremia.   -CMP daily.   -Plan discussed with primary team and Hepatology team.     Thank you for allowing us to participate in the care of this patient. Please call with questions.        Milli Sun MD  Gastroenterology Fellow, PGY-VI  [facility]         [1]   Social History  Tobacco Use    Smoking status: Never    Smokeless tobacco: Never   Substance Use Topics    Alcohol use: Not  Currently     Comment: not for a year    Drug use: Not Currently     Types: Marijuana

## 2025-07-28 NOTE — PLAN OF CARE
Patient transferred to [facility] overnight. AAO x4. CIWA score 0. Ammonia 23 on admit - lactulose ordered BID. Hepatology, AES, and ID consulted. Zosyn continued Q8H. Repeat BC pending. Plan for ERCP tomorrow - to be NPO after midnight. Paracentesis done today in IR - 1.5L removed; fluid sent to lab. Patient up ad kaylynn in room. Daughter at bedside this afternoon.

## 2025-07-28 NOTE — ASSESSMENT & PLAN NOTE
Patient's blood pressure range in the last 24 hours was: BP  Min: 120/76  Max: 155/82.The patient's inpatient anti-hypertensive regimen is listed below:  Current Antihypertensives       Plan  - Hold home regimen for now. Monitor and adjust PRN.

## 2025-07-28 NOTE — PROCEDURES
Radiology Post-Procedure Note    Pre Op Diagnosis: Ascites  Post Op Diagnosis: Same    Procedure: Ultrasound Guided Paracentesis    Procedure performed by: Michele HICKEY, [place]     Written Informed Consent Obtained: Yes  Specimen Removed: YES serous  Estimated Blood Loss: Minimal    Findings:   Successful RLQ paracentesis.  Albumin administered PRN per protocol.    Patient tolerated procedure well.    [place] Bautista, APRN, FNP  Interventional Radiology  [phone] clinic

## 2025-07-28 NOTE — ASSESSMENT & PLAN NOTE
Patient's most recent potassium results are listed below.   Recent Labs     07/28/25  0113   K 3.2*     - Replete potassium per protocol  - Monitor potassium Daily

## 2025-07-28 NOTE — PLAN OF CARE
Patient is aaox4, vss, RA. Telemetry monitoring, box # [id number]. Admit . Blood cx pending. K 3.2 PO replacement administered. Skin and sclera yellow. Zosyn infusing q8h. NPO for possible procedure. WBC 13.61 Skin intact. Out of bed independently. Nonskid socks, bed low, wheels locked, call light in reach.

## 2025-07-28 NOTE — ASSESSMENT & PLAN NOTE
"Patient has sepsis without organ dysfunction secondary to e coli bacteremia, likely GI in origin. A review of systems was completed. Patient's sepsis is improving    Current Antibiotics    piperacillin-tazobactam (ZOSYN) 4.5 g in D5W 100 mL IVPB (MB+), Every 8 hours (non-standard times), Intravenous    Lactate  Recent Labs   Lab 07/25/25  1639 07/28/25  0113   LACTATE 1.4 1.5     Culture Data  Blood Cultures No results found for: "CULTBLD"   mSOFA  MSOFA Total  Min: 0   Min taken time: 07/28/25 0200  Max: 3   Max taken time: 07/28/25 0300    Plan  - Antibiotics as listed above  - Fluid resuscitation as follows: performed at OSH, no further needed at this time.  - Follow up culture data and evaluate for more definitive source (cholangitis vs SBP).  - Vasopressors were not needed  - The following services were consulted: hepatology, AES.    "

## 2025-07-29 ENCOUNTER — ANESTHESIA (OUTPATIENT)
Dept: ENDOSCOPY | Facility: HOSPITAL | Age: 65
End: 2025-07-29
Payer: MEDICARE

## 2025-07-29 PROBLEM — I73.9 PAD (PERIPHERAL ARTERY DISEASE): Status: ACTIVE | Noted: 2025-07-29

## 2025-07-29 LAB
ABSOLUTE EOSINOPHIL (OHS): 0.17 K/UL
ABSOLUTE MONOCYTE (OHS): 0.96 K/UL (ref 0.3–1)
ABSOLUTE NEUTROPHIL COUNT (OHS): 7.68 K/UL (ref 1.8–7.7)
ALBUMIN SERPL BCP-MCNC: 1.7 G/DL (ref 3.5–5.2)
ALP SERPL-CCNC: 712 UNIT/L (ref 40–150)
ALT SERPL W/O P-5'-P-CCNC: 112 UNIT/L (ref 0–55)
AMMONIA PLAS-SCNC: 28 UMOL/L (ref 10–50)
ANION GAP (OHS): 8 MMOL/L (ref 8–16)
APTT PPP: 26.1 SECONDS (ref 21–32)
AST SERPL-CCNC: 233 UNIT/L (ref 0–50)
BASOPHILS # BLD AUTO: 0.08 K/UL
BASOPHILS NFR BLD AUTO: 0.8 %
BILIRUB DIRECT SERPL-MCNC: 5.2 MG/DL (ref 0.1–0.3)
BILIRUB SERPL-MCNC: 6.6 MG/DL (ref 0.1–1)
BUN SERPL-MCNC: 13 MG/DL (ref 8–23)
CALCIUM SERPL-MCNC: 8.2 MG/DL (ref 8.7–10.5)
CHLORIDE SERPL-SCNC: 108 MMOL/L (ref 95–110)
CO2 SERPL-SCNC: 23 MMOL/L (ref 23–29)
CREAT SERPL-MCNC: 0.5 MG/DL (ref 0.5–1.4)
ERYTHROCYTE [DISTWIDTH] IN BLOOD BY AUTOMATED COUNT: 16.1 % (ref 11.5–14.5)
FOLATE SERPL-MCNC: 7.5 NG/ML (ref 4–24)
GFR SERPLBLD CREATININE-BSD FMLA CKD-EPI: >60 ML/MIN/1.73/M2
GGT SERPL-CCNC: 250 U/L (ref 8–55)
GLUCOSE SERPL-MCNC: 88 MG/DL (ref 70–110)
HCT VFR BLD AUTO: 31.4 % (ref 40–54)
HGB BLD-MCNC: 9.9 GM/DL (ref 14–18)
HOLD SPECIMEN: NORMAL
IMM GRANULOCYTES # BLD AUTO: 0.1 K/UL (ref 0–0.04)
IMM GRANULOCYTES NFR BLD AUTO: 1 % (ref 0–0.5)
INR PPP: 1.1 (ref 0.8–1.2)
LYMPHOCYTES # BLD AUTO: 1.25 K/UL (ref 1–4.8)
MAGNESIUM SERPL-MCNC: 1.9 MG/DL (ref 1.6–2.6)
MCH RBC QN AUTO: 31 PG (ref 27–31)
MCHC RBC AUTO-ENTMCNC: 31.5 G/DL (ref 32–36)
MCV RBC AUTO: 98 FL (ref 82–98)
NUCLEATED RBC (/100WBC) (OHS): 0 /100 WBC
PHOSPHATE SERPL-MCNC: 3 MG/DL (ref 2.7–4.5)
PLATELET # BLD AUTO: 511 K/UL (ref 150–450)
PMV BLD AUTO: 8.8 FL (ref 9.2–12.9)
POCT GLUCOSE: 92 MG/DL (ref 70–110)
POCT GLUCOSE: 92 MG/DL (ref 70–110)
POTASSIUM SERPL-SCNC: 3.7 MMOL/L (ref 3.5–5.1)
PROT SERPL-MCNC: 5.9 GM/DL (ref 6–8.4)
PROTHROMBIN TIME: 12.2 SECONDS (ref 9–12.5)
RBC # BLD AUTO: 3.19 M/UL (ref 4.6–6.2)
RELATIVE EOSINOPHIL (OHS): 1.7 %
RELATIVE LYMPHOCYTE (OHS): 12.2 % (ref 18–48)
RELATIVE MONOCYTE (OHS): 9.4 % (ref 4–15)
RELATIVE NEUTROPHIL (OHS): 74.9 % (ref 38–73)
SODIUM SERPL-SCNC: 139 MMOL/L (ref 136–145)
VIT B12 SERPL-MCNC: 1414 PG/ML (ref 210–950)
WBC # BLD AUTO: 10.24 K/UL (ref 3.9–12.7)

## 2025-07-29 PROCEDURE — 27202304 HC CANNULA, ERCP: Performed by: INTERNAL MEDICINE

## 2025-07-29 PROCEDURE — 63600175 PHARM REV CODE 636 W HCPCS: Performed by: STUDENT IN AN ORGANIZED HEALTH CARE EDUCATION/TRAINING PROGRAM

## 2025-07-29 PROCEDURE — 27200946 HC BRUSH, CYTOLOGY: Performed by: INTERNAL MEDICINE

## 2025-07-29 PROCEDURE — 27202127 HC STENT INTRODUCER: Performed by: INTERNAL MEDICINE

## 2025-07-29 PROCEDURE — 83735 ASSAY OF MAGNESIUM: CPT | Performed by: STUDENT IN AN ORGANIZED HEALTH CARE EDUCATION/TRAINING PROGRAM

## 2025-07-29 PROCEDURE — C1769 GUIDE WIRE: HCPCS | Performed by: INTERNAL MEDICINE

## 2025-07-29 PROCEDURE — 37000009 HC ANESTHESIA EA ADD 15 MINS: Performed by: INTERNAL MEDICINE

## 2025-07-29 PROCEDURE — 20600001 HC STEP DOWN PRIVATE ROOM

## 2025-07-29 PROCEDURE — 82607 VITAMIN B-12: CPT | Performed by: STUDENT IN AN ORGANIZED HEALTH CARE EDUCATION/TRAINING PROGRAM

## 2025-07-29 PROCEDURE — 85730 THROMBOPLASTIN TIME PARTIAL: CPT | Performed by: STUDENT IN AN ORGANIZED HEALTH CARE EDUCATION/TRAINING PROGRAM

## 2025-07-29 PROCEDURE — 25000003 PHARM REV CODE 250: Performed by: NURSE ANESTHETIST, CERTIFIED REGISTERED

## 2025-07-29 PROCEDURE — 88112 CYTOPATH CELL ENHANCE TECH: CPT | Mod: 26,,, | Performed by: STUDENT IN AN ORGANIZED HEALTH CARE EDUCATION/TRAINING PROGRAM

## 2025-07-29 PROCEDURE — C2617 STENT, NON-COR, TEM W/O DEL: HCPCS | Performed by: INTERNAL MEDICINE

## 2025-07-29 PROCEDURE — 94761 N-INVAS EAR/PLS OXIMETRY MLT: CPT

## 2025-07-29 PROCEDURE — 25000003 PHARM REV CODE 250: Performed by: STUDENT IN AN ORGANIZED HEALTH CARE EDUCATION/TRAINING PROGRAM

## 2025-07-29 PROCEDURE — 63600175 PHARM REV CODE 636 W HCPCS: Performed by: NURSE ANESTHETIST, CERTIFIED REGISTERED

## 2025-07-29 PROCEDURE — 36415 COLL VENOUS BLD VENIPUNCTURE: CPT | Performed by: STUDENT IN AN ORGANIZED HEALTH CARE EDUCATION/TRAINING PROGRAM

## 2025-07-29 PROCEDURE — 74328 X-RAY BILE DUCT ENDOSCOPY: CPT | Mod: 26,,, | Performed by: INTERNAL MEDICINE

## 2025-07-29 PROCEDURE — 88112 CYTOPATH CELL ENHANCE TECH: CPT | Mod: TC | Performed by: INTERNAL MEDICINE

## 2025-07-29 PROCEDURE — 99233 SBSQ HOSP IP/OBS HIGH 50: CPT | Mod: GC,,, | Performed by: STUDENT IN AN ORGANIZED HEALTH CARE EDUCATION/TRAINING PROGRAM

## 2025-07-29 PROCEDURE — 43274 ERCP DUCT STENT PLACEMENT: CPT | Mod: ,,, | Performed by: INTERNAL MEDICINE

## 2025-07-29 PROCEDURE — 0DJ08ZZ INSPECTION OF UPPER INTESTINAL TRACT, VIA NATURAL OR ARTIFICIAL OPENING ENDOSCOPIC: ICD-10-PCS | Performed by: HOSPITALIST

## 2025-07-29 PROCEDURE — 43259 EGD US EXAM DUODENUM/JEJUNUM: CPT | Mod: 51,,, | Performed by: INTERNAL MEDICINE

## 2025-07-29 PROCEDURE — 85610 PROTHROMBIN TIME: CPT | Performed by: STUDENT IN AN ORGANIZED HEALTH CARE EDUCATION/TRAINING PROGRAM

## 2025-07-29 PROCEDURE — 82746 ASSAY OF FOLIC ACID SERUM: CPT | Performed by: STUDENT IN AN ORGANIZED HEALTH CARE EDUCATION/TRAINING PROGRAM

## 2025-07-29 PROCEDURE — 27201674 HC SPHINCTERTOME: Performed by: INTERNAL MEDICINE

## 2025-07-29 PROCEDURE — 43274 ERCP DUCT STENT PLACEMENT: CPT | Performed by: INTERNAL MEDICINE

## 2025-07-29 PROCEDURE — 80053 COMPREHEN METABOLIC PANEL: CPT | Performed by: STUDENT IN AN ORGANIZED HEALTH CARE EDUCATION/TRAINING PROGRAM

## 2025-07-29 PROCEDURE — 84100 ASSAY OF PHOSPHORUS: CPT | Performed by: STUDENT IN AN ORGANIZED HEALTH CARE EDUCATION/TRAINING PROGRAM

## 2025-07-29 PROCEDURE — 43259 EGD US EXAM DUODENUM/JEJUNUM: CPT | Performed by: INTERNAL MEDICINE

## 2025-07-29 PROCEDURE — 25500020 PHARM REV CODE 255: Performed by: INTERNAL MEDICINE

## 2025-07-29 PROCEDURE — 37000008 HC ANESTHESIA 1ST 15 MINUTES: Performed by: INTERNAL MEDICINE

## 2025-07-29 PROCEDURE — 85025 COMPLETE CBC W/AUTO DIFF WBC: CPT | Performed by: STUDENT IN AN ORGANIZED HEALTH CARE EDUCATION/TRAINING PROGRAM

## 2025-07-29 PROCEDURE — 0F798DZ DILATION OF COMMON BILE DUCT WITH INTRALUMINAL DEVICE, VIA NATURAL OR ARTIFICIAL OPENING ENDOSCOPIC: ICD-10-PCS | Performed by: HOSPITALIST

## 2025-07-29 PROCEDURE — 82248 BILIRUBIN DIRECT: CPT | Performed by: STUDENT IN AN ORGANIZED HEALTH CARE EDUCATION/TRAINING PROGRAM

## 2025-07-29 PROCEDURE — 0FD98ZX EXTRACTION OF COMMON BILE DUCT, VIA NATURAL OR ARTIFICIAL OPENING ENDOSCOPIC, DIAGNOSTIC: ICD-10-PCS | Performed by: HOSPITALIST

## 2025-07-29 PROCEDURE — 82140 ASSAY OF AMMONIA: CPT | Performed by: STUDENT IN AN ORGANIZED HEALTH CARE EDUCATION/TRAINING PROGRAM

## 2025-07-29 PROCEDURE — 93923 UPR/LXTR ART STDY 3+ LVLS: CPT | Mod: 26,,, | Performed by: SURGERY

## 2025-07-29 PROCEDURE — 74328 X-RAY BILE DUCT ENDOSCOPY: CPT | Mod: TC | Performed by: INTERNAL MEDICINE

## 2025-07-29 PROCEDURE — G0545 PR VISIT INHERENT TO INPT OR OBS CARE, INFECTIOUS DISEASE: HCPCS | Mod: ,,, | Performed by: STUDENT IN AN ORGANIZED HEALTH CARE EDUCATION/TRAINING PROGRAM

## 2025-07-29 PROCEDURE — 82977 ASSAY OF GGT: CPT | Performed by: STUDENT IN AN ORGANIZED HEALTH CARE EDUCATION/TRAINING PROGRAM

## 2025-07-29 RX ORDER — GLUCAGON 1 MG
1 KIT INJECTION
Status: DISCONTINUED | OUTPATIENT
Start: 2025-07-29 | End: 2025-07-29 | Stop reason: HOSPADM

## 2025-07-29 RX ORDER — PHENYLEPHRINE HYDROCHLORIDE 10 MG/ML
INJECTION INTRAVENOUS
Status: DISCONTINUED | OUTPATIENT
Start: 2025-07-29 | End: 2025-07-29

## 2025-07-29 RX ORDER — CEFTRIAXONE 2 G/1
2 INJECTION, POWDER, FOR SOLUTION INTRAMUSCULAR; INTRAVENOUS
Status: DISCONTINUED | OUTPATIENT
Start: 2025-07-29 | End: 2025-07-29

## 2025-07-29 RX ORDER — PROPOFOL 10 MG/ML
VIAL (ML) INTRAVENOUS
Status: DISCONTINUED | OUTPATIENT
Start: 2025-07-29 | End: 2025-07-29

## 2025-07-29 RX ORDER — SODIUM CHLORIDE 0.9 % (FLUSH) 0.9 %
10 SYRINGE (ML) INJECTION
Status: DISCONTINUED | OUTPATIENT
Start: 2025-07-29 | End: 2025-07-29 | Stop reason: HOSPADM

## 2025-07-29 RX ORDER — LIDOCAINE HYDROCHLORIDE 20 MG/ML
INJECTION, SOLUTION EPIDURAL; INFILTRATION; INTRACAUDAL; PERINEURAL
Status: DISCONTINUED | OUTPATIENT
Start: 2025-07-29 | End: 2025-07-29

## 2025-07-29 RX ADMIN — MUPIROCIN: 20 OINTMENT TOPICAL at 09:07

## 2025-07-29 RX ADMIN — PIPERACILLIN SODIUM AND TAZOBACTAM SODIUM 4.5 G: 4; .5 INJECTION, POWDER, FOR SOLUTION INTRAVENOUS at 06:07

## 2025-07-29 RX ADMIN — PHENYLEPHRINE HYDROCHLORIDE 100 MCG: 10 INJECTION INTRAVENOUS at 02:07

## 2025-07-29 RX ADMIN — PIPERACILLIN SODIUM AND TAZOBACTAM SODIUM 4.5 G: 4; .5 INJECTION, POWDER, LYOPHILIZED, FOR SOLUTION INTRAVENOUS at 03:07

## 2025-07-29 RX ADMIN — LACTULOSE 10 G: 20 SOLUTION ORAL at 09:07

## 2025-07-29 RX ADMIN — SODIUM CHLORIDE: 0.9 INJECTION, SOLUTION INTRAVENOUS at 01:07

## 2025-07-29 RX ADMIN — GABAPENTIN 600 MG: 300 CAPSULE ORAL at 08:07

## 2025-07-29 RX ADMIN — BUPROPION HYDROCHLORIDE 100 MG: 100 TABLET, FILM COATED ORAL at 08:07

## 2025-07-29 RX ADMIN — MUPIROCIN: 20 OINTMENT TOPICAL at 08:07

## 2025-07-29 RX ADMIN — PROPOFOL 50 MG: 10 INJECTION, EMULSION INTRAVENOUS at 01:07

## 2025-07-29 RX ADMIN — PIPERACILLIN SODIUM AND TAZOBACTAM SODIUM 4.5 G: 4; .5 INJECTION, POWDER, FOR SOLUTION INTRAVENOUS at 10:07

## 2025-07-29 RX ADMIN — PROPOFOL 150 MCG/KG/MIN: 10 INJECTION, EMULSION INTRAVENOUS at 01:07

## 2025-07-29 RX ADMIN — GABAPENTIN 600 MG: 300 CAPSULE ORAL at 09:07

## 2025-07-29 RX ADMIN — BUPROPION HYDROCHLORIDE 100 MG: 100 TABLET, FILM COATED ORAL at 09:07

## 2025-07-29 RX ADMIN — LIDOCAINE HYDROCHLORIDE 60 MG: 20 INJECTION, SOLUTION EPIDURAL; INFILTRATION; INTRACAUDAL; PERINEURAL at 01:07

## 2025-07-29 RX ADMIN — LACTULOSE 10 G: 20 SOLUTION ORAL at 08:07

## 2025-07-29 NOTE — ASSESSMENT & PLAN NOTE
Likely in setting of known liver dz and probable nutritional deficiencies. Most recent hemoglobin and hematocrit are listed below.  Recent Labs     07/28/25  0113 07/29/25  0557   HGB 11.0* 9.9*   HCT 34.3* 31.4*     Plan  - Monitor serial CBC: Daily  - Order iron studies and vitamin levels for evaluation.   - Transfuse PRBC if patient becomes hemodynamically unstable, symptomatic or H/H drops below 7/21.  - Patient has not received any PRBC transfusions to date  - Patient's anemia is currently stable

## 2025-07-29 NOTE — ASSESSMENT & PLAN NOTE
Blood Cx from outside hospital from 7/25 were positive.  Likely secondary to intra-abdominal source.  Blood cultures from 7/28 have no growth to date  Ascitic fluid does not reveal evidence of SBP  Continue Zosyn for now, we will likely deescalate in the coming days  Awaiting ERCP for final antibiotic recommendation

## 2025-07-29 NOTE — TRANSFER OF CARE
"Anesthesia Transfer of Care Note    Patient: Eric Ma    Procedure(s) Performed: Procedure(s) (LRB):  ERCP (ENDOSCOPIC RETROGRADE CHOLANGIOPANCREATOGRAPHY) (N/A)  ULTRASOUND, UPPER GI TRACT, ENDOSCOPIC    Patient location: PACU    Anesthesia Type: general    Transport from OR: Transported from OR on 2-3 L/min O2 by NC with adequate spontaneous ventilation    Post pain: adequate analgesia    Post assessment: no apparent anesthetic complications and tolerated procedure well    Post vital signs: stable    Level of consciousness: awake, oriented and alert    Nausea/Vomiting: no nausea/vomiting    Complications: none    Transfer of care protocol was followed    Last vitals: Visit Vitals  BP 95/60   Pulse 74   Temp 36.5 °C (97.7 °F)   Resp 20   Ht 5' 7.5" (1.715 m)   Wt 66 kg (145 lb 7 oz)   SpO2 100%   BMI 22.44 kg/m²     "

## 2025-07-29 NOTE — PROVATION PATIENT INSTRUCTIONS
Discharge Summary/Instructions after an Endoscopic Procedure  Patient Name: Eric Ma  Patient MRN: [id number]  Patient YOB: 1960 Tuesday, July 29, 2025  Janusz Stern MD  Dear patient,  As a result of recent federal legislation (The Federal Cures Act), you may   receive lab or pathology results from your procedure in your [facility]   account before your physician is able to contact you. Your physician or   their representative will relay the results to you with their   recommendations at their soonest availability.  Thank you,  RESTRICTIONS:  During your procedure today, you received medications for sedation.  These   medications may affect your judgment, balance and coordination.  Therefore,   for 24 hours, you have the following restrictions:   - DO NOT drive a car, operate machinery, make legal/financial decisions,   sign important papers or drink alcohol.    ACTIVITY:  Today: no heavy lifting, straining or running due to procedural   sedation/anesthesia.  The following day: return to full activity including work.  DIET:  Eat and drink normally unless instructed otherwise.     TREATMENT FOR COMMON SIDE EFFECTS:  - Mild abdominal pain, nausea, belching, bloating or excessive gas:  rest,   eat lightly and use a heating pad.  - Sore Throat: treat with throat lozenges and/or gargle with warm salt   water.  - Because air was used during the procedure, expelling large amounts of air   from your rectum or belching is normal.  - If a bowel prep was taken, you may not have a bowel movement for 1-3 days.    This is normal.  SYMPTOMS TO WATCH FOR AND REPORT TO YOUR PHYSICIAN:  1. Abdominal pain or bloating, other than gas cramps.  2. Chest pain.  3. Back pain.  4. Signs of infection such as: chills or fever occurring within 24 hours   after the procedure.  5. Rectal bleeding, which would show as bright red, maroon, or black stools.   (A tablespoon of blood from the rectum is not serious, especially if    hemorrhoids are present.)  6. Vomiting.  7. Weakness or dizziness.  GO DIRECTLY TO THE NEAREST EMERGENCY ROOM IF YOU HAVE ANY OF THE FOLLOWING:      Difficulty breathing              Chills and/or fever over 101 F   Persistent vomiting and/or vomiting blood   Severe abdominal pain   Severe chest pain   Black, tarry stools   Bleeding- more than one tablespoon   Any other symptom or condition that you feel may need urgent attention  Your doctor recommends these additional instructions:  If any biopsies were taken, your doctors clinic will contact you in 1 to 2   weeks with any results.  - [facility].   - Resume previous diet.   - Perform an ERCP today.  For questions, problems or results please call your physician - Janusz Stern MD at Work:  [phone].  [facility], EMERGENCY ROOM PHONE NUMBER: [phone]  IF A COMPLICATION OR EMERGENCY SITUATION ARISES AND YOU ARE UNABLE TO REACH   YOUR PHYSICIAN - GO DIRECTLY TO THE EMERGENCY ROOM.  Janusz Stern MD  7/29/2025 1:57:26 PM  This report has been verified and signed electronically.  Dear patient,  As a result of recent federal legislation (The Federal Cures Act), you may   receive lab or pathology results from your procedure in your [facility]   account before your physician is able to contact you. Your physician or   their representative will relay the results to you with their   recommendations at their soonest availability.  Thank you,  PROVATION

## 2025-07-29 NOTE — TREATMENT PLAN
AES Post Procedure Treatment Plan  Procedure Performed: EUS/ERCP    Impression:              - Normal esophagus.   - Normal stomach.   - Normal examined duodenum.   - Hyperechoic material consistent with sludge was visualized endosonographically in the lower third of the main bile duct.   - Hyperechoic material consistent with sludge was visualized endosonographically in the gallbladder body.   - No specimens collected.   - Possible passed stone along with sludge     Impression:              - A single localized biliary stricture was found in the lower third of the main bile duct. The   stricture was indeterminate.   - Cells for cytology obtained in the lower third of the main duct.     Recommendations:          - Discharge patient to home.   - Resume previous diet.   - Continue present medications.   - Repeat ERCP in 6 weeks to exchange stent.       Milli Sun MD  Gastroenterology, PGY-VI

## 2025-07-29 NOTE — ASSESSMENT & PLAN NOTE
Patient's most recent potassium results are listed below.   Recent Labs     07/28/25  0113 07/29/25  0557   K 3.2* 3.7     - Replete potassium per protocol  - Monitor potassium Daily

## 2025-07-29 NOTE — ASSESSMENT & PLAN NOTE
Elevated LFTs, hyperbili noted initially 6/30 for which pt was admitted to OSH at that time with workup showing mild biliary duct dilation. MRCP performed at that time that did not show choledocholithiasis or significant ductal dilation and pt's LFTs remained elevated but bili downtrended. Pt discharged with GI follow up. However pt with persistent abd pain and jaundice as well as fever so represented with reoccurrence of worsening cholestatic pattern of labs. CBD dilation noted again on U/S at OSH 7/25 w/o evidence of choledocholithiasis. Found to have e coli bacteremia from uncertain source although likely GI source (cholangitis vs SBP vs other).    - NPO for now for possible procedure.   - Consult AES.  - Continue zosyn and follow up OSH culture data.  - Daily labs to include CMP with fractionated bili, coags, GGT.    7/28 E coli bacteremia - Likely GI in origin given his cirrhosis/biliary obstruction.  Continue IV zosyn.Repeat BC pending. US Liver - cirrhotic liver morphology. and measures 14.1 cm in the right midclavicular line. There is no evidence of hepatic mass. There is hepatopedal flow in the main portal vein. Moderate ascites. common bile duct is mildly dilated to 9 mm in diameter. Echogenic material in the common bile duct. No evidence of gallstones. Gallbladder wall thickening is nonspecific in the setting of ascites. Choledocholithiasis cannot be excluded on the basis of this exam. Consider MRCP.  Hepatology and AES consulted. IR consulted for paracentesis.  7/29 no evidence of SBP . ERCP today. ID eval - continue IV zosyn   7/30 EUS                   - Normal esophagus.                          - Normal stomach.                          - Normal examined duodenum.                          - Hyperechoic material consistent with sludge was                          visualized endosonographically in the lower third                          of the main bile duct.                          - Hyperechoic  material consistent with sludge was                          visualized endosonographically in the gallbladder                          body.                          - No specimens collected.    Possible passed stone along with sludge . resumed diet   ERCP -single localized biliary stricture was found  in the lower third of the main bile yohannes s/p stent  Cells for cytology obtained in the lower third of the main duct.Repeat ERCP in 6 weeks to exchange stent.   BC x2 7/28 NGTD. Discharge home today

## 2025-07-29 NOTE — ASSESSMENT & PLAN NOTE
Likely multifactorial in setting of decompensated cirrhosis and biliary obstruction with infection. Further discussion and plan as in related problems.   Patients liver enzymes  elevated.   Recent Labs     07/28/25  0113 07/29/25  0557   BILITOT 9.0* 6.6*   * 233*   * 112*   ALKPHOS 671* 712*   monitor

## 2025-07-29 NOTE — ASSESSMENT & PLAN NOTE
Leucocytosis  Patient with leucocytosis   Recent Labs   Lab 07/28/25  0113 07/29/25  0557   WBC 13.61* 10.24     . Afebrile. BCX 2 pending . likely secondary to sepsis.

## 2025-07-29 NOTE — NURSING TRANSFER
Nursing Transfer Note      7/29/2025   3:15 PM    Nurse giving handoff:SUYAPA Epstein PACU  Nurse receiving handoff:SUYAPA Peraza    Reason patient is being transferred: Post op care     Transfer To: [id number]    Transfer via stretcher    Transfer with cardiac monitoring    Transported by PCT      Telemetry: Box Number [id number]  Order for Tele Monitor? Yes      Medicines sent: None    Any special needs or follow-up needed: Routine care    Patient belongings transferred with patient: No (only a hat)    Chart send with patient: Yes    Notified: daughter    Patient reassessed at: 1515 7/29/25

## 2025-07-29 NOTE — ASSESSMENT & PLAN NOTE
Patient's blood pressure range in the last 24 hours was: BP  Min: 95/60  Max: 139/82.The patient's inpatient anti-hypertensive regimen is listed below:  Current Antihypertensives       Plan  - Hold home regimen for now. Monitor and adjust PRN.

## 2025-07-29 NOTE — TREATMENT PLAN
Hepatology Treatment Plan    Eric Ma is a 65 y.o. male admitted to hospital 7/28/2025 (Hospital Day: 2) due to E coli bacteremia.     Interval History  Received paracentesis yesterday with IR, 1500mL removed. WBC count of 10 in ascitic fluid. Calculated SAAG 1.6, likely etiology of portal hypertension. No concern for SBP at this time. ERCP scheduled for today with GI.    Objective  Temp:  [98 °F (36.7 °C)-98.7 °F (37.1 °C)] 98.3 °F (36.8 °C) (07/29 0832)  Pulse:  [69-96] 73 (07/29 0832)  BP: (115-139)/(69-82) 139/82 (07/29 0832)  Resp:  [18] 18 (07/29 0435)  SpO2:  [96 %-98 %] 97 % (07/29 0832)    General: Alert, Oriented x3, no distress  Neurologic: Asterixis absent  Abdomen: Normoactive bowel sounds, distended. Normal tympany. Soft. Non-tender. No peritoneal signs.   Edema: no LE edema present    Laboratory    Lab Results   Component Value Date    WBC 10.24 07/29/2025    HGB 9.9 (L) 07/29/2025    HCT 31.4 (L) 07/29/2025    MCV 98 07/29/2025     (H) 07/29/2025       Lab Results   Component Value Date     07/29/2025    K 3.7 07/29/2025     07/29/2025    CO2 23 07/29/2025    BUN 13 07/29/2025    CREATININE 0.5 07/29/2025    CALCIUM 8.2 (L) 07/29/2025       Lab Results   Component Value Date    ALBUMIN 1.7 (L) 07/29/2025     (H) 07/29/2025     (H) 07/29/2025     (H) 07/29/2025    ALKPHOS 712 (H) 07/29/2025    BILITOT 6.6 (H) 07/29/2025       Lab Results   Component Value Date    INR 1.1 07/29/2025    INR 1.2 07/28/2025    INR 0.97 08/11/2023       MELD 3.0: 19 at 7/29/2025  5:57 AM  MELD-Na: 15 at 7/29/2025  5:57 AM  Calculated from:  Serum Creatinine: 0.5 mg/dL (Using min of 1 mg/dL) at 7/29/2025  5:57 AM  Serum Sodium: 139 mmol/L (Using max of 137 mmol/L) at 7/29/2025  5:57 AM  Total Bilirubin: 6.6 mg/dL at 7/29/2025  5:57 AM  Serum Albumin: 1.7 g/dL at 7/29/2025  5:57 AM  INR(ratio): 1.1 at 7/29/2025  5:57 AM  Age at listing (hypothetical): 65 years  Sex: Male at  7/29/2025  5:57 AM      Assessment  corby Ma is a 65 y.o. male with history of alcohol use disorder, decompensated cirrhosis, UC, HTN, HLD, who presents as transfer from OSH for hepatology evaluation in setting of elevated liver enzymes, ascites, and ecoli bacteremia. Given imaging findings of dilated CBD, there is concern for biliary obstruction. Now s/p paracentesis with 1500mL removed. Ascitic labs showed no concern of SBP, SAAG 1.6, likely etiology of ascites is portal hypertension.     Plan  - plan for ERCP today with GI, will follow up findings  - currently no LE edema, patient with first paracentesis yesterday, will monitor volume status, but no indication for diuretics currently  - continue IV Zosyn    Thank you for involving us in the care of Eric Ma. Please call with any additional concerns or questions.    Devang Bassett MD  PGY-1  [facility]

## 2025-07-29 NOTE — PROGRESS NOTES
[facility] - Transplant Stepdown  Infectious Disease  Progress Note    Patient Name: Eric Ma  MRN: [id number]  Admission Date: 7/28/2025  Length of Stay: 1 days  Attending Physician: Lake Jones MD  Primary Care Provider: Adelita Hernandez NP    Isolation Status: No active isolations  Assessment/Plan:      ID  * E coli bacteremia   Blood Cx from outside hospital from 7/25 were positive.  Likely secondary to intra-abdominal source.   Blood cultures from 7/28 have no growth to date  Ascitic fluid does not reveal evidence of SBP  Continue Zosyn for now, we will likely deescalate in the coming days  Awaiting ERCP for final antibiotic recommendation    Anticipated Disposition: TBD    Thank you for your consult. ID will sign off. Please contact us if you have any additional questions.    Lalitha Thomas MD  Infectious Disease  [facility] - Transplant Stepdown    Subjective:     Principal Problem:E coli bacteremia    Interval History:  Patient was afebrile overnight.  He states that he is overall feeling better.  He still has tenderness from where he previously had an injury at his work, but otherwise has no abdominal tenderness.  He denies any chills, headache, or any other new symptoms.  White blood cell count improved.  Bilirubin improved compared to yesterday, however AST, ALT, and ALP have slightly increased.    Review of Systems   Constitutional:  Negative for chills, fatigue and fever.   HENT:  Negative for congestion.    Respiratory:  Negative for cough and shortness of breath.    Cardiovascular:  Negative for chest pain.   Gastrointestinal:  Positive for abdominal distention. Negative for abdominal pain, constipation, diarrhea, nausea and vomiting.   Endocrine: Negative for polyuria.   Genitourinary:  Negative for dysuria.   Musculoskeletal:  Negative for back pain and myalgias.   Skin:  Negative for rash and wound.   Neurological:  Negative for dizziness and headaches.     Objective:     Vital  Signs (Most Recent):  Temp: 98.2 °F (36.8 °C) (07/29/25 1530)  Pulse: 77 (07/29/25 1547)  Resp: 16 (07/29/25 1530)  BP: 131/75 (07/29/25 1530)  SpO2: 99 % (07/29/25 1530) Vital Signs (24h Range):  Temp:  [97.7 °F (36.5 °C)-98.7 °F (37.1 °C)] 98.2 °F (36.8 °C)  Pulse:  [67-87] 77  Resp:  [16-23] 16  SpO2:  [96 %-100 %] 99 %  BP: ()/(60-82) 131/75     Weight: 66 kg (145 lb 7 oz)  Body mass index is 22.44 kg/m².    Estimated Creatinine Clearance: 137.5 mL/min (based on SCr of 0.5 mg/dL).     Physical Exam  Vitals reviewed.   Constitutional:       General: He is not in acute distress.     Appearance: He is not diaphoretic.   HENT:      Head: Normocephalic and atraumatic.      Nose: Nose normal.   Eyes:      General: Scleral icterus present.      Conjunctiva/sclera: Conjunctivae normal.   Pulmonary:      Effort: Pulmonary effort is normal. No respiratory distress.   Abdominal:      General: There is distension.   Musculoskeletal:      Right lower leg: Edema present.      Left lower leg: Edema present.   Skin:     General: Skin is warm.      Coloration: Skin is jaundiced.   Neurological:      General: No focal deficit present.      Mental Status: He is alert and oriented to person, place, and time. Mental status is at baseline.      Cranial Nerves: No cranial nerve deficit.   Psychiatric:         Behavior: Behavior normal.          Significant Labs: All pertinent labs within the past 24 hours have been reviewed.    Significant Imaging: I have reviewed all pertinent imaging results/findings within the past 24 hours.

## 2025-07-29 NOTE — SUBJECTIVE & OBJECTIVE
Interval History:  Patient was afebrile overnight.  He states that he is overall feeling better.  He still has tenderness from where he previously had an injury at his work, but otherwise has no abdominal tenderness.  He denies any chills, headache, or any other new symptoms.  White blood cell count improved.  Bilirubin improved compared to yesterday, however AST, ALT, and ALP have slightly increased.    Review of Systems   Constitutional:  Negative for chills, fatigue and fever.   HENT:  Negative for congestion.    Respiratory:  Negative for cough and shortness of breath.    Cardiovascular:  Negative for chest pain.   Gastrointestinal:  Positive for abdominal distention. Negative for abdominal pain, constipation, diarrhea, nausea and vomiting.   Endocrine: Negative for polyuria.   Genitourinary:  Negative for dysuria.   Musculoskeletal:  Negative for back pain and myalgias.   Skin:  Negative for rash and wound.   Neurological:  Negative for dizziness and headaches.     Objective:     Vital Signs (Most Recent):  Temp: 98.2 °F (36.8 °C) (07/29/25 1530)  Pulse: 77 (07/29/25 1547)  Resp: 16 (07/29/25 1530)  BP: 131/75 (07/29/25 1530)  SpO2: 99 % (07/29/25 1530) Vital Signs (24h Range):  Temp:  [97.7 °F (36.5 °C)-98.7 °F (37.1 °C)] 98.2 °F (36.8 °C)  Pulse:  [67-87] 77  Resp:  [16-23] 16  SpO2:  [96 %-100 %] 99 %  BP: ()/(60-82) 131/75     Weight: 66 kg (145 lb 7 oz)  Body mass index is 22.44 kg/m².    Estimated Creatinine Clearance: 137.5 mL/min (based on SCr of 0.5 mg/dL).     Physical Exam  Vitals reviewed.   Constitutional:       General: He is not in acute distress.     Appearance: He is not diaphoretic.   HENT:      Head: Normocephalic and atraumatic.      Nose: Nose normal.   Eyes:      General: Scleral icterus present.      Conjunctiva/sclera: Conjunctivae normal.   Pulmonary:      Effort: Pulmonary effort is normal. No respiratory distress.   Abdominal:      General: There is distension.    Musculoskeletal:      Right lower leg: Edema present.      Left lower leg: Edema present.   Skin:     General: Skin is warm.      Coloration: Skin is jaundiced.   Neurological:      General: No focal deficit present.      Mental Status: He is alert and oriented to person, place, and time. Mental status is at baseline.      Cranial Nerves: No cranial nerve deficit.   Psychiatric:         Behavior: Behavior normal.          Significant Labs: All pertinent labs within the past 24 hours have been reviewed.    Significant Imaging: I have reviewed all pertinent imaging results/findings within the past 24 hours.

## 2025-07-29 NOTE — PLAN OF CARE
K+=3.7. Cr=0.5. Phos=3.0. Alk Phos=712 inc from 671. TB=6.6 inc from 9.0. CBZ=955 inc from 186. ALT=112 inc from 104. Inc IFC=549. Inc Vit I87=8845. EGD and ERCP with stent placement completed. Vasc U/S completed to BLE. Ambulates independently wearing non-skid socks. Afebrile. Zosyn continues. Glucoses stable. Glucose checks discontinued. Denies pain, nausea and vomiting. UOP adequate. Daughter at BS this afternoon.

## 2025-07-29 NOTE — ASSESSMENT & PLAN NOTE
Patient has sepsis without organ dysfunction secondary to e coli bacteremia, likely GI in origin. A review of systems was completed. Patient's sepsis is improving    Current Antibiotics    piperacillin-tazobactam (ZOSYN) 4.5 g in D5W 100 mL IVPB (MB+), Every 8 hours (non-standard times), Intravenous    Lactate  Recent Labs   Lab 07/25/25  1639 07/28/25  0113   LACTATE 1.4 1.5     Culture Data  Blood Cultures   Blood Culture   Date Value Ref Range Status   07/28/2025 No Growth After 24 Hours  Preliminary   07/28/2025 No Growth After 24 Hours  Preliminary      mSOFA  MSOFA Total  Min: 3   Min taken time: 07/29/25 1401  Max: 5   Max taken time: 07/29/25 1501    - Antibiotics as listed above  - Fluid resuscitation as follows: performed at OSH, no further needed at this time.  - Follow up culture data and evaluate for more definitive source (cholangitis vs SBP).  - Vasopressors were not needed  - The following services were consulted: hepatology, AES.     Not applicable

## 2025-07-29 NOTE — PLAN OF CARE
Pt aaox4 vswnl and no c/o pain. Bed in low position and callbell within reach. Pt ambulates independently. Py NPO after MN for EGD. Zosyn continued for UTI. Neurovascular assessment lower BLE for purplish skin tone. Vascular arterial US ordered. Pt jaundice and ascites. Para done on dayshift with 1500cc off. Lactulose and 3 BMs noted . Accu ck at 4050=756. Telemetry maintained NSR. +2 BLE edema noted.

## 2025-07-29 NOTE — ASSESSMENT & PLAN NOTE
Co-morbidities are present and inclusive of ascites, portal hypertension, malnutrition, anemia/pancytopenia, and anticoagulation.  MELD-Na score calculated; MELD 3.0: 19 at 7/29/2025  5:57 AM  MELD-Na: 15 at 7/29/2025  5:57 AM  Calculated from:  Serum Creatinine: 0.5 mg/dL (Using min of 1 mg/dL) at 7/29/2025  5:57 AM  Serum Sodium: 139 mmol/L (Using max of 137 mmol/L) at 7/29/2025  5:57 AM  Total Bilirubin: 6.6 mg/dL at 7/29/2025  5:57 AM  Serum Albumin: 1.7 g/dL at 7/29/2025  5:57 AM  INR(ratio): 1.1 at 7/29/2025  5:57 AM  Age at listing (hypothetical): 65 years  Sex: Male at 7/29/2025  5:57 AM      Continue chronic meds. Etiology thought to be likely ETOH although with also known hx of UC. NAIF checked at OSH which was negative. Will avoid any hepatotoxic meds, and monitor CBC/CMP/INR for synthetic function. Consult hepatology and AES.

## 2025-07-29 NOTE — ASSESSMENT & PLAN NOTE
Likely reactive in setting of infection/inflammation with ongoing GI issues. Continue to monitor.    Recent Labs   Lab 07/28/25  0113 07/29/25  0557   * 511*   monitor

## 2025-07-29 NOTE — PROGRESS NOTES
[facility] - Transplant [facility] Medicine  Progress Note    Patient Name: Eric Ma  MRN: [id number]  Patient Class: IP- Inpatient   Admission Date: 7/28/2025  Length of Stay: 1 days  Attending Physician: Lake Jones MD  Primary Care Provider: Adelita Hernandez NP        Subjective     Principal Problem:E coli bacteremia        HPI:  Eric Ma is 65 y.o. male with history of alcohol use disorder (quit 04/2025, intermittently sober over the last 2yrs), decompensated cirrhosis, UC, HTN, HLD, and depression who presents as a transfer for evaluation of biliary obstruction and decompensated cirrhosis with sepsis 2/2 e coli bacteremia concerning for ascending cholangitis.     Pt initially dx with cirrhosis thought to be alcoholic in nature in 04/2024 while following with his GI outpatient. Elevated LFTs, hyperbili noted initially 6/30 for which pt was admitted to OSH at that time with workup showing mild biliary duct dilation. MRCP performed at that time that did not show choledocholithiasis or significant ductal dilation and pt's LFTs remained elevated but bili downtrended. Pt discharged with GI follow up. However pt with persistent abd pain, distention, and jaundice as well as fever/chills so represented with reoccurrence of worsening cholestatic pattern of labs as well as elevated WBC to 18k and procal 1.2. CBD dilation noted again on U/S as well as moderate ascites at OSH 7/25 w/o evidence of choledocholithiasis. Pt discussed [place] [facility] [place] transfer for evaluation with hepatology and other services. Pt also found to have e coli bacteremia during workup while awaiting transfer for which he was started on zosyn with improvement in WBC and procal as well as symptomatically.    At time of admit evaluation, pt reports that he overall feels improved. His fever/chills and abd pain have improved/nearly resolved. His abd distention remains stable. He notes some mild BL LE edema  that he states occurred during transport. He reports that he has chronic left sided chest/rib soreness from a prior crush injury which required hardware implantation which is stable. He otherwise denies URI sxs, cough, dyspnea, hematochezia/melena, constipation, diarrhea, nausea, vomiting, decreased UOP, dysuria, dizziness, syncope, confusion.          Overview/Hospital Course:  7/28 E coli bacteremia - Likely GI in origin given his cirrhosis/biliary obstruction.  Continue IV zosyn.Repeat BC pending. US Liver - cirrhotic liver morphology. and measures 14.1 cm in the right midclavicular line. There is no evidence of hepatic mass. There is hepatopedal flow in the main portal vein. Moderate ascites. common bile duct is mildly dilated to 9 mm in diameter. Echogenic material in the common bile duct. No evidence of gallstones. Gallbladder wall thickening is nonspecific in the setting of ascites. Choledocholithiasis cannot be excluded on the basis of this exam. Consider MRCP.  Hepatology and AES consulted - Plan for ERCP tomorrow. NPO at midnight. IR consulted for paracentesis.1500 ml removed. labs pending. K replaced . bilateral feet to be purple and cool to touch. denies pain or claudication. Neurovascular checks. arterial US LE ordered  7/29 no evidence of SBP . ERCP today. ID eval - continue IV zosyn . LE arterial US - Right Leg: Segmental pressures suggest medial calcinosis. PVR waveforms suggest no evidence of significant PAOD.   Left Leg: Segmental pressures suggest medial calcinosis. PVR waveforms suggest no evidence of significant PAOD.   EUS                   - Normal esophagus.                          - Normal stomach.                          - Normal examined duodenum.                          - Hyperechoic material consistent with sludge was                          visualized endosonographically in the lower third                          of the main bile duct.                          - Hyperechoic  material consistent with sludge was                          visualized endosonographically in the gallbladder                          body.                          - No specimens collected.    Possible passed stone along with sludge . resumed diet         Review of Systems:   Pain scale:  Constitutional:  fever,  chills, headache, vision loss, hearing loss, weight loss, Generalized weakness, falls, loss of smell, loss of taste, poor appetite,  sore throat  Respiratory: cough, shortness of breath.   Cardiovascular: chest pain, dizziness, palpitations, orthopnea, swelling of feet, syncope  Gastrointestinal: nausea, vomiting, abdominal pain, diarrhea, black stool,  blood in stool, change in bowel habits, constipation  Genitourinary: hematuria, dysuria, urgency, frequency  Integument/Breast: rash,  pruritis  Hematologic/Lymphatic: easy bruising, lymphadenopathy  Musculoskeletal: arthralgias , myalgias, back pain, neck pain, knee pain  Neurological: confusion, seizures, tremors, slurred speech  Behavioral/Psych:  depression, anxiety, auditory or visual hallucinations     OBJECTIVE:     Physical Exam:  Body mass index is 22.44 kg/m².    Constitutional: Appears well-developed and well-nourished.   Head: Normocephalic and atraumatic. +icterus  Neck: Normal range of motion. Neck supple.   Cardiovascular: Normal heart rate.  Regular heart rhythm.  Pulmonary/Chest: Effort normal. chronic chest wall soreness (Prior crush injury)   Abdominal: + distension.  No tenderness  Musculoskeletal: Normal range of motion. no  edema.   Neurological: Alert and oriented to person, place, and time.   Skin: Skin is warm and dry.   Psychiatric: Normal mood and affect. Behavior is normal.                  Vital Signs  Temp: 98.2 °F (36.8 °C) (07/29/25 1530)  Pulse: 77 (07/29/25 1547)  Resp: 16 (07/29/25 1530)  BP: 131/75 (07/29/25 1530)  SpO2: 99 % (07/29/25 1530)     24 Hour VS Range    Temp:  [97.7 °F (36.5 °C)-98.7 °F (37.1 °C)]   Pulse:   "[67-96]   Resp:  [16-23]   BP: ()/(60-82)   SpO2:  [96 %-100 %]     Intake/Output Summary (Last 24 hours) at 7/29/2025 1600  Last data filed at 7/29/2025 1429  Gross per 24 hour   Intake 1320 ml   Output 625 ml   Net 695 ml         I/O This Shift:  I/O this shift:  In: 900 [IV Piggyback:900]  Out: 225 [Urine:225]    Wt Readings from Last 3 Encounters:   07/28/25 66 kg (145 lb 7 oz)   07/25/25 63.5 kg (139 lb 15.9 oz)   08/22/23 67.8 kg (149 lb 6.4 oz)       I have personally reviewed the vitals and recorded Intake/Output     Laboratory/Diagnostic Data:    CBC/Anemia Labs: Coags:    Recent Labs   Lab 07/28/25  0113 07/29/25  0557   WBC 13.61* 10.24   HGB 11.0* 9.9*   HCT 34.3* 31.4*   * 511*   MCV 98 98   RDW 16.3* 16.1*   IRON 79  --    FERRITIN 1,259.0*  --    FOLATE  --  7.5   [id number]  --  1,414*    Recent Labs   Lab 07/28/25  0113 07/29/25  0557   INR 1.2 1.1   APTT 25.8 26.1        Chemistries: ABG:   Recent Labs   Lab 07/28/25  0113 07/29/25  0557    139   K 3.2* 3.7    108   CO2 24 23   BUN 15 13   CREATININE 0.6 0.5   CALCIUM 8.7 8.2*   PROT 6.9 5.9*   BILITOT 9.0* 6.6*   ALKPHOS 671* 712*   * 112*   * 233*   MG 1.9 1.9   PHOS 3.5 3.0    No results for input(s): "PH", "PCO2", "PO2", "HCO3", "POCSATURATED", "BE" in the last 168 hours.     POCT Glucose: HbA1c:    Recent Labs   Lab 07/28/25  0114 07/28/25  0752 07/28/25  1244 07/28/25  2116 07/29/25  0603 07/29/25  1029   POCTGLUCOSE 113* 95 109 131* 92 92    Hemoglobin A1C   Date Value Ref Range Status   10/19/2021 5.2 3.9 - 6.1 % Final     Comment:     ADA Screening Guidelines:  5.7-6.4%  Consistent with prediabetes  >or=6.5%  Consistent with diabetes    The Hemoglobin A1c assay has significant interference with   Fetal hemoglobin(HbF), producing a negative bias with this   assay. This assay should not be used to diagnose diabetes   during pregnancy, sickle cell trait, anemias, chronic   hepatitis or renal disease. " "These samples must be   assayed by an alternate method.          Cardiac Enzymes: Ejection Fractions:    No results for input(s): "CPK", "CPKMB", "MB", "TROPONINI" in the last 72 hours. EF   Date Value Ref Range Status   10/01/2021 60 % Final          Recent Labs   Lab 07/28/25  0147   COLORU Yellow   APPEARANCEUA Clear   PHUR 6.0   SPECGRAV 1.020   PROTEINUA Negative   GLUCUA Negative   BILIRUBINUA 1+*   OCCULTUA Trace*   NITRITE Negative   UROBILINOGEN 4.0-6.0*   LEUKOCYTESUR Negative       Procalcitonin (ng/mL)   Date Value   07/28/2025 0.47 (H)     Lactic Acid Level (mmol/L)   Date Value   07/28/2025 1.5     Lactic Acid (mmol/L)   Date Value   07/25/2025 1.4     No results found for: "BNP"  No results found for: "CRP", "SEDRATE"  D-Dimer (ng/mL)   Date Value   09/30/2021 269     Ferritin (ng/mL)   Date Value   07/28/2025 1,259.0 (H)     No results found for: "LDH"  Troponin I (ng/mL)   Date Value   10/01/2021 <0.020   10/01/2021 <0.020   09/30/2021 <0.020   09/30/2021 <0.020     No results found for this or any previous visit.  POC Rapid COVID (no units)   Date Value   09/30/2021 Negative       Microbiology labs for the last week  Microbiology Results (last 7 days)       Procedure Component Value Units Date/Time    Aerobic culture [[id number]] Collected: 07/28/25 0930    Order Status: Completed Specimen: Ascites Updated: 07/29/25 0742     CULTURE, AEROBIC No Growth To Date    Culture, Anaerobic [[id number]] Collected: 07/28/25 0930    Order Status: Completed Specimen: Ascites Updated: 07/29/25 0731     Anaerobe Culture Culture In Progress    Blood culture [[id number]]  (Normal) Collected: 07/28/25 0113    Order Status: Completed Specimen: Blood from Peripheral, Antecubital, Right Updated: 07/29/25 0700     Blood Culture No Growth After 24 Hours    Blood culture [[id number]]  (Normal) Collected: 07/28/25 0128    Order Status: Completed Specimen: Blood from Peripheral, Forearm, Right Updated: 07/29/25 0700     " Blood Culture No Growth After 24 Hours    Gram stain [[id number]] Collected: 07/28/25 0930    Order Status: Completed Specimen: Ascites Updated: 07/28/25 1726     GRAM STAIN Rare WBC seen      No organisms seen    Blood culture [[id number]] Collected: 07/28/25 0128    Order Status: Sent Specimen: Blood from Peripheral, Antecubital, Right             Reviewed and noted in plan where applicable- Please see chart for full lab data.    Lines/Drains:  Peripheral IV 07/28/25 0143 22 G Anterior;Proximal;Right Forearm (Active)   Site Assessment Clean;Intact;Dry 07/28/25 0400   Line Status Infusing 07/28/25 0400   Number of days: 0       Imaging      Results for orders placed during the hospital encounter of 09/30/21    Echo    Interpretation Summary  · The left ventricle is normal in size with normal systolic function.  · The estimated ejection fraction is 60%. Normal wall motion  · Normal left ventricular diastolic function.  · Normal right ventricular size with normal right ventricular systolic function.  · Normal central venous pressure (3 mmHg).  · The estimated PA systolic pressure is 17 mmHg.      US Endoscopic Ultrasound  See OP Notes for results.     IMPRESSION: See OP Notes for results.     This procedure was auto-finalized by: Michelle Radiologist  [place] ERCP Biliary And Pancreatic  See OP Notes for results.     IMPRESSION: See OP Notes for results.     This procedure was auto-finalized by: Virtual Radiologist  VAS US Arterial Legs Bilateral  Indication  ========    Cold Feet and Digits    Pressure Lower  ============    Rt brachial A syst BP  139 mmHg  Rt low thigh BP    255 mmHg  Rt calf  mmHg  Rt PTA BP  191 mmHg  Rt dors pedis A  mmHg  Rt toe BP  0 mmHg  Right JOSE ratio use:   post. tibial  Rt JOSE post tibial (rt post tib A BP / max brach A BP) 1.37  Rt JOSE (rt dors ped A BP / max brach A BP) 1.28  Rt ankle BP / max brach A BP   1.37  Rt TBI (rt toe BP / max brach A BP)    0.00  Lt low thigh BP    255  mmHg  Lt calf  mmHg  Lt PTA BP  175 mmHg  Lt dors pedis A  mmHg  Lt toe BP  89 mmHg  Left JOSE ratio use:    post. tibial  Lt JOSE (lt post tibial A BP / max brach A BP)  1.26  Lt JOSE dors ped (lt dors ped A BP / max brach A BP)    0.83  Lt ankle BP / max brach A BP   1.26  Lt TBI (lt toe BP / max brach A BP)    0.64  PAD severity based on JOSE on right:    no evidence of significant PAOD  PAD severity based on JOSE left:    no evidence of significant PAOD    PPG Lower  =========    Rt toe BP - PPG    0 mmHg  Rt toe digit waveform: absent  Lt toe BP - PPG    89 mmHg  Lt toe digit waveform: moderately dampened    Comment  ========    Ankle-Brachial Indices that exceed 1.3 are considered evidence of dense, calcified plaque. The presence of artifactually elevated  pressures in arteries of the lower extremity can suggest medial calcinosis; therefore, toe pressures and toe brachial indices are  obtained.    Impression  =========    Right Leg: Segmental pressures suggest medial calcinosis. PVR waveforms suggest no evidence of significant PAOD.  Rt lower digits: Toe PPG waveforms as described above. - TBI of 0 with a Great toe pressure of 0 mmHg is noted.    Left Leg: Segmental pressures suggest medial calcinosis. PVR waveforms suggest no evidence of significant PAOD.  Lt lower digits: Toe PPG waveforms as described above. - TBI of 0.64 with a Great toe pressure of 89 mmHg is noted.    DATE OF SERVICE: 07/29/2025                                                      Sonographer: Shirley Guzman  Electronically Signed by: RODERICK Diane M.D. at 07/29/2025-10:30      Labs, Imaging, EKG and Diagnostic results from 7/29/2025 were reviewed.    Medications:  Medication list was reviewed and changes noted under Assessment/Plan.  Medications Ordered Prior to Encounter[1]  Scheduled Medications:  Current Facility-Administered Medications   Medication Dose Route Frequency    buPROPion  100 mg Oral BID    gabapentin  600 mg Oral  BID    lactulose  10 g Oral BID    mupirocin   Nasal BID    piperacillin-tazobactam (Zosyn) IV (PEDS and ADULTS) (extended infusion is not appropriate)  4.5 g Intravenous Q8H     PRN:   Current Facility-Administered Medications:     acetaminophen, 650 mg, Oral, Q4H PRN    dextrose 50%, 12.5 g, Intravenous, PRN    dextrose 50%, 25 g, Intravenous, PRN    glucagon (human recombinant), 1 mg, Intramuscular, PRN    glucose, 16 g, Oral, PRN    glucose, 24 g, Oral, PRN    melatonin, 6 mg, Oral, Nightly PRN    naloxone, 0.4 mg, Intravenous, PRN    polyethylene glycol, 17 g, Oral, Daily PRN    prochlorperazine, 5 mg, Oral, Q6H PRN    senna-docusate, 1 tablet, Oral, BID PRN    sodium chloride 0.9%, 10 mL, Intravenous, Q12H PRN  Infusions:   Estimated Creatinine Clearance: 137.5 mL/min (based on SCr of 0.5 mg/dL).           Assessment & Plan  E coli bacteremia  Likely GI in origin given his cirrhosis/biliary obstruction although evidence of definitive source likely  - Continue zosyn.  - Repeat Bcx here and follow up OSH culture data.  - Consult hepatology and AES. May also need IR vs med consult team for paracentesis to evaluate ascitic fluid.  7/28 E coli bacteremia - Likely GI in origin given his cirrhosis/biliary obstruction.  Continue IV zosyn.Repeat BC pending. US Liver - cirrhotic liver morphology. and measures 14.1 cm in the right midclavicular line. There is no evidence of hepatic mass. There is hepatopedal flow in the main portal vein. Moderate ascites. common bile duct is mildly dilated to 9 mm in diameter. Echogenic material in the common bile duct. No evidence of gallstones. Gallbladder wall thickening is nonspecific in the setting of ascites. Choledocholithiasis cannot be excluded on the basis of this exam. Consider MRCP.  Hepatology and AES consulted. IR consulted for paracentesis.K replaced     Sepsis 2/2 e coli bacteremia  Patient has sepsis without organ dysfunction secondary to e coli bacteremia, likely GI in  origin. A review of systems was completed. Patient's sepsis is improving    Current Antibiotics    piperacillin-tazobactam (ZOSYN) 4.5 g in D5W 100 mL IVPB (MB+), Every 8 hours (non-standard times), Intravenous    Lactate  Recent Labs   Lab 07/25/25  1639 07/28/25  0113   LACTATE 1.4 1.5     Culture Data  Blood Cultures   Blood Culture   Date Value Ref Range Status   07/28/2025 No Growth After 24 Hours  Preliminary   07/28/2025 No Growth After 24 Hours  Preliminary      mSOFA  MSOFA Total  Min: 3   Min taken time: 07/29/25 1401  Max: 5   Max taken time: 07/29/25 1501    - Antibiotics as listed above  - Fluid resuscitation as follows: performed at OSH, no further needed at this time.  - Follow up culture data and evaluate for more definitive source (cholangitis vs SBP).  - Vasopressors were not needed  - The following services were consulted: hepatology, AES.    Biliary obstruction  Elevated LFTs, hyperbili noted initially 6/30 for which pt was admitted to OSH at that time with workup showing mild biliary duct dilation. MRCP performed at that time that did not show choledocholithiasis or significant ductal dilation and pt's LFTs remained elevated but bili downtrended. Pt discharged with GI follow up. However pt with persistent abd pain and jaundice as well as fever so represented with reoccurrence of worsening cholestatic pattern of labs. CBD dilation noted again on U/S at OSH 7/25 w/o evidence of choledocholithiasis. Found to have e coli bacteremia from uncertain source although likely GI source (cholangitis vs SBP vs other).    - NPO for now for possible procedure.   - Consult AES.  - Continue zosyn and follow up OSH culture data.  - Daily labs to include CMP with fractionated bili, coags, GGT.    7/28 E coli bacteremia - Likely GI in origin given his cirrhosis/biliary obstruction.  Continue IV zosyn.Repeat BC pending. US Liver - cirrhotic liver morphology. and measures 14.1 cm in the right midclavicular line.  There is no evidence of hepatic mass. There is hepatopedal flow in the main portal vein. Moderate ascites. common bile duct is mildly dilated to 9 mm in diameter. Echogenic material in the common bile duct. No evidence of gallstones. Gallbladder wall thickening is nonspecific in the setting of ascites. Choledocholithiasis cannot be excluded on the basis of this exam. Consider MRCP.  Hepatology and AES consulted. IR consulted for paracentesis.K replaced     Transaminitis  Likely multifactorial in setting of decompensated cirrhosis and biliary obstruction with infection. Further discussion and plan as in related problems.  Recent Labs     07/28/25  0113 07/29/25  0557   BILITOT 9.0* 6.6*   * 233*   * 112*   ALKPHOS 671* 712*    monitor     Hyperbilirubinemia  Likely multifactorial in setting of decompensated cirrhosis and biliary obstruction with infection. Further discussion and plan as in related problems.   Patients liver enzymes  elevated.   Recent Labs     07/28/25  0113 07/29/25  0557   BILITOT 9.0* 6.6*   * 233*   * 112*   ALKPHOS 671* 712*   monitor     Decompensated cirrhosis  Co-morbidities are present and inclusive of ascites, portal hypertension, malnutrition, anemia/pancytopenia, and anticoagulation.  MELD-Na score calculated; MELD 3.0: 19 at 7/29/2025  5:57 AM  MELD-Na: 15 at 7/29/2025  5:57 AM  Calculated from:  Serum Creatinine: 0.5 mg/dL (Using min of 1 mg/dL) at 7/29/2025  5:57 AM  Serum Sodium: 139 mmol/L (Using max of 137 mmol/L) at 7/29/2025  5:57 AM  Total Bilirubin: 6.6 mg/dL at 7/29/2025  5:57 AM  Serum Albumin: 1.7 g/dL at 7/29/2025  5:57 AM  INR(ratio): 1.1 at 7/29/2025  5:57 AM  Age at listing (hypothetical): 65 years  Sex: Male at 7/29/2025  5:57 AM      Continue chronic meds. Etiology thought to be likely ETOH although with also known hx of UC. NAIF checked at OSH which was negative. Will avoid any hepatotoxic meds, and monitor CBC/CMP/INR for synthetic function.  Consult hepatology and AES.  Ulcerative colitis  Followed by GI outpatient, on mesalamine.    Plan  - Hold mesalamine.    HTN (hypertension)  Patient's blood pressure range in the last 24 hours was: BP  Min: 95/60  Max: 139/82.The patient's inpatient anti-hypertensive regimen is listed below:  Current Antihypertensives       Plan  - Hold home regimen for now. Monitor and adjust PRN.    HLD (hyperlipidemia)    - Hold statin in setting of elevated LFTs.    Alcohol use  Pt reports he has been trying to quit over the last 2 yrs and had intermittent sobriety. He states he last drank in 04/2025. He denies any history of alcohol withdrawal.    Plan  - CIWA q4 for now, likely able to discontinue pending stability. Likely low risk for withdrawal at this point.  - PETH ordered.  - Continue to  on and encourage cessation.    Normocytic anemia  Likely in setting of known liver dz and probable nutritional deficiencies. Most recent hemoglobin and hematocrit are listed below.  Recent Labs     07/28/25 0113 07/29/25  0557   HGB 11.0* 9.9*   HCT 34.3* 31.4*     Plan  - Monitor serial CBC: Daily  - Order iron studies and vitamin levels for evaluation.   - Transfuse PRBC if patient becomes hemodynamically unstable, symptomatic or H/H drops below 7/21.  - Patient has not received any PRBC transfusions to date  - Patient's anemia is currently stable  Thrombocytosis  Likely reactive in setting of infection/inflammation with ongoing GI issues. Continue to monitor.    Recent Labs   Lab 07/28/25 0113 07/29/25  0557   * 511*   monitor   Depression    - Continue home wellbutrin.      Hypokalemia  Patient's most recent potassium results are listed below.   Recent Labs     07/28/25 0113 07/29/25  0557   K 3.2* 3.7     - Replete potassium per protocol  - Monitor potassium Daily  Leucocytosis  Leucocytosis  Patient with leucocytosis   Recent Labs   Lab 07/28/25 0113 07/29/25  0557   WBC 13.61* 10.24     . Afebrile. BCX 2 pending  . likely secondary to sepsis.  PAD (peripheral artery disease)        VTE Risk Mitigation (From admission, onward)           Ordered     IP VTE HIGH RISK PATIENT  Once         07/28/25 0328     Place sequential compression device  Until discontinued         07/28/25 0328     Reason for No Pharmacological VTE Prophylaxis  Once        Comments: Hold for possible procedure.   Question:  Reasons:  Answer:  Physician Provided (leave comment)    07/28/25 0328     Place sequential compression device  Until discontinued         07/28/25 0052                    Discharge Planning   NELLY: 7/31/2025     Code Status: Full Code   Medical Readiness for Discharge Date:   Discharge Plan A: Home with family                        Lake STEWART Jones MD  Department of Hospital Medicine   [facility]         [1]   No current facility-administered medications on file prior to encounter.     Current Outpatient Medications on File Prior to Encounter   Medication Sig Dispense Refill    atorvastatin (LIPITOR) 40 MG tablet Take 1 tablet (40 mg total) by mouth once daily. 90 tablet 3    buPROPion (WELLBUTRIN SR) 100 MG TBSR 12 hr tablet Take 100 mg by mouth 2 (two) times daily.      cholecalciferol, vitamin D3, (VITAMIN D3) 50 mcg (2,000 unit) Cap capsule Take 1 capsule (2,000 Units total) by mouth once daily. 90 capsule 3    gabapentin (NEURONTIN) 600 MG tablet Take 0.5 tablets (300 mg total) by mouth every evening. (Patient taking differently: Take 300 mg by mouth 3 (three) times daily.) 15 tablet 11    isosorbide mononitrate (IMDUR) 30 MG 24 hr tablet Take 1 tablet (30 mg total) by mouth once daily. 90 tablet 3    lisinopriL (PRINIVIL,ZESTRIL) 5 MG tablet Take 1 tablet (5 mg total) by mouth once daily. 90 tablet 3    mesalamine (DELZICOL) 400 mg cdti cdti capsule Take 2 capsules (800 mg total) by mouth 3 (three) times daily. 180 capsule 11    nicotine (NICODERM CQ) 7 mg/24 hr Place 1 patch onto the skin once daily. 30  patch 3    tiZANidine (ZANAFLEX) 4 MG tablet Take 4 mg by mouth every 8 (eight) hours as needed.

## 2025-07-29 NOTE — [employer] PATIENT INSTRUCTIONS
Discharge Summary/Instructions after an Endoscopic Procedure  Patient Name: Eric Ma  Patient MRN: [id number]  Patient YOB: 1960 Tuesday, July 29, 2025  Janusz Stern MD  Dear patient,  As a result of recent federal legislation (The Federal Cures Act), you may   receive lab or pathology results from your procedure in your [facility]   account before your physician is able to contact you. Your physician or   their representative will relay the results to you with their   recommendations at their soonest availability.  Thank you,  RESTRICTIONS:  During your procedure today, you received medications for sedation.  These   medications may affect your judgment, balance and coordination.  Therefore,   for 24 hours, you have the following restrictions:   - DO NOT drive a car, operate machinery, make legal/financial decisions,   sign important papers or drink alcohol.    ACTIVITY:  Today: no heavy lifting, straining or running due to procedural   sedation/anesthesia.  The following day: return to full activity including work.  DIET:  Eat and drink normally unless instructed otherwise.     TREATMENT FOR COMMON SIDE EFFECTS:  - Mild abdominal pain, nausea, belching, bloating or excessive gas:  rest,   eat lightly and use a heating pad.  - Sore Throat: treat with throat lozenges and/or gargle with warm salt   water.  - Because air was used during the procedure, expelling large amounts of air   from your rectum or belching is normal.  - If a bowel prep was taken, you may not have a bowel movement for 1-3 days.    This is normal.  SYMPTOMS TO WATCH FOR AND REPORT TO YOUR PHYSICIAN:  1. Abdominal pain or bloating, other than gas cramps.  2. Chest pain.  3. Back pain.  4. Signs of infection such as: chills or fever occurring within 24 hours   after the procedure.  5. Rectal bleeding, which would show as bright red, maroon, or black stools.   (A tablespoon of blood from the rectum is not serious, especially if    hemorrhoids are present.)  6. Vomiting.  7. Weakness or dizziness.  GO DIRECTLY TO THE NEAREST EMERGENCY ROOM IF YOU HAVE ANY OF THE FOLLOWING:      Difficulty breathing              Chills and/or fever over 101 F   Persistent vomiting and/or vomiting blood   Severe abdominal pain   Severe chest pain   Black, tarry stools   Bleeding- more than one tablespoon   Any other symptom or condition that you feel may need urgent attention  Your doctor recommends these additional instructions:  If any biopsies were taken, your doctors clinic will contact you in 1 to 2   weeks with any results.  - Discharge patient to home.   - Resume previous diet.   - Continue present medications.   - Repeat ERCP in 6 weeks to exchange stent.  For questions, problems or results please call your physician - Janusz Stern MD at Work:  [phone].  [facility] [place], EMERGENCY ROOM PHONE NUMBER: [phone]  IF A COMPLICATION OR EMERGENCY SITUATION ARISES AND YOU ARE UNABLE TO REACH   YOUR PHYSICIAN - GO DIRECTLY TO THE EMERGENCY ROOM.  Janusz Stern MD  7/29/2025 3:58:42 PM  This report has been verified and signed electronically.  Dear patient,  As a result of recent federal legislation (The Federal Cures Act), you may   receive lab or pathology results from your procedure in your [facility]   account before your physician is able to contact you. Your physician or   their representative will relay the results to you with their   recommendations at their soonest availability.  Thank you,  [employer]OVATION

## 2025-07-29 NOTE — ASSESSMENT & PLAN NOTE
Likely multifactorial in setting of decompensated cirrhosis and biliary obstruction with infection. Further discussion and plan as in related problems.  Recent Labs     07/28/25  0113 07/29/25  0557   BILITOT 9.0* 6.6*   * 233*   * 112*   ALKPHOS 671* 712*    monitor

## 2025-07-30 ENCOUNTER — TELEPHONE (OUTPATIENT)
Dept: TRANSPLANT | Facility: CLINIC | Age: 65
End: 2025-07-30
Payer: MEDICARE

## 2025-07-30 VITALS
DIASTOLIC BLOOD PRESSURE: 79 MMHG | HEART RATE: 72 BPM | TEMPERATURE: 98 F | RESPIRATION RATE: 20 BRPM | OXYGEN SATURATION: 97 % | HEIGHT: 68 IN | WEIGHT: 148.5 LBS | SYSTOLIC BLOOD PRESSURE: 127 MMHG | BODY MASS INDEX: 22.51 KG/M2

## 2025-07-30 PROBLEM — I73.9 PAD (PERIPHERAL ARTERY DISEASE): Status: RESOLVED | Noted: 2025-07-29 | Resolved: 2025-07-30

## 2025-07-30 LAB
ABSOLUTE EOSINOPHIL (OHS): 0.2 K/UL
ABSOLUTE MONOCYTE (OHS): 0.72 K/UL (ref 0.3–1)
ABSOLUTE NEUTROPHIL COUNT (OHS): 9.86 K/UL (ref 1.8–7.7)
ALBUMIN SERPL BCP-MCNC: 1.8 G/DL (ref 3.5–5.2)
ALP SERPL-CCNC: 653 UNIT/L (ref 40–150)
ALT SERPL W/O P-5'-P-CCNC: 106 UNIT/L (ref 0–55)
AMMONIA PLAS-SCNC: 38 UMOL/L (ref 10–50)
ANION GAP (OHS): 7 MMOL/L (ref 8–16)
APTT PPP: 25.2 SECONDS (ref 21–32)
AST SERPL-CCNC: 195 UNIT/L (ref 0–50)
BASOPHILS # BLD AUTO: 0.1 K/UL
BASOPHILS NFR BLD AUTO: 0.8 %
BILIRUB DIRECT SERPL-MCNC: 4.5 MG/DL (ref 0.1–0.3)
BILIRUB SERPL-MCNC: 5.8 MG/DL (ref 0.1–1)
BUN SERPL-MCNC: 13 MG/DL (ref 8–23)
CALCIUM SERPL-MCNC: 7.9 MG/DL (ref 8.7–10.5)
CANCER AG19-9 SERPL-ACNC: 234.7 U/ML
CHLORIDE SERPL-SCNC: 109 MMOL/L (ref 95–110)
CO2 SERPL-SCNC: 24 MMOL/L (ref 23–29)
CREAT SERPL-MCNC: 0.6 MG/DL (ref 0.5–1.4)
ERYTHROCYTE [DISTWIDTH] IN BLOOD BY AUTOMATED COUNT: 16.3 % (ref 11.5–14.5)
GFR SERPLBLD CREATININE-BSD FMLA CKD-EPI: >60 ML/MIN/1.73/M2
GGT SERPL-CCNC: 224 U/L (ref 8–55)
GLUCOSE SERPL-MCNC: 91 MG/DL (ref 70–110)
HCT VFR BLD AUTO: 30 % (ref 40–54)
HGB BLD-MCNC: 9.5 GM/DL (ref 14–18)
IMM GRANULOCYTES # BLD AUTO: 0.14 K/UL (ref 0–0.04)
IMM GRANULOCYTES NFR BLD AUTO: 1.1 % (ref 0–0.5)
INR PPP: 1.1 (ref 0.8–1.2)
LYMPHOCYTES # BLD AUTO: 1.4 K/UL (ref 1–4.8)
MAGNESIUM SERPL-MCNC: 1.9 MG/DL (ref 1.6–2.6)
MCH RBC QN AUTO: 31.4 PG (ref 27–31)
MCHC RBC AUTO-ENTMCNC: 31.7 G/DL (ref 32–36)
MCV RBC AUTO: 99 FL (ref 82–98)
NUCLEATED RBC (/100WBC) (OHS): 0 /100 WBC
OHS QRS DURATION: 102 MS
OHS QTC CALCULATION: 435 MS
PHOSPHATE SERPL-MCNC: 3.2 MG/DL (ref 2.7–4.5)
PLATELET # BLD AUTO: 489 K/UL (ref 150–450)
PLPETH BLD-MCNC: <10 NG/ML
PMV BLD AUTO: 8.6 FL (ref 9.2–12.9)
POPETH BLD-MCNC: <10 NG/ML
POTASSIUM SERPL-SCNC: 3.7 MMOL/L (ref 3.5–5.1)
PROT SERPL-MCNC: 5.9 GM/DL (ref 6–8.4)
PROTHROMBIN TIME: 12.3 SECONDS (ref 9–12.5)
RBC # BLD AUTO: 3.03 M/UL (ref 4.6–6.2)
RELATIVE EOSINOPHIL (OHS): 1.6 %
RELATIVE LYMPHOCYTE (OHS): 11.3 % (ref 18–48)
RELATIVE MONOCYTE (OHS): 5.8 % (ref 4–15)
RELATIVE NEUTROPHIL (OHS): 79.4 % (ref 38–73)
SODIUM SERPL-SCNC: 140 MMOL/L (ref 136–145)
TOXICOLOGIST REVIEW: NORMAL
WBC # BLD AUTO: 12.42 K/UL (ref 3.9–12.7)

## 2025-07-30 PROCEDURE — 82248 BILIRUBIN DIRECT: CPT | Performed by: STUDENT IN AN ORGANIZED HEALTH CARE EDUCATION/TRAINING PROGRAM

## 2025-07-30 PROCEDURE — 25000003 PHARM REV CODE 250: Performed by: STUDENT IN AN ORGANIZED HEALTH CARE EDUCATION/TRAINING PROGRAM

## 2025-07-30 PROCEDURE — 93005 ELECTROCARDIOGRAM TRACING: CPT

## 2025-07-30 PROCEDURE — 84100 ASSAY OF PHOSPHORUS: CPT | Performed by: STUDENT IN AN ORGANIZED HEALTH CARE EDUCATION/TRAINING PROGRAM

## 2025-07-30 PROCEDURE — 85730 THROMBOPLASTIN TIME PARTIAL: CPT | Performed by: STUDENT IN AN ORGANIZED HEALTH CARE EDUCATION/TRAINING PROGRAM

## 2025-07-30 PROCEDURE — 25000003 PHARM REV CODE 250: Performed by: HOSPITALIST

## 2025-07-30 PROCEDURE — 93010 ELECTROCARDIOGRAM REPORT: CPT | Mod: ,,, | Performed by: INTERNAL MEDICINE

## 2025-07-30 PROCEDURE — 82140 ASSAY OF AMMONIA: CPT | Performed by: STUDENT IN AN ORGANIZED HEALTH CARE EDUCATION/TRAINING PROGRAM

## 2025-07-30 PROCEDURE — 85610 PROTHROMBIN TIME: CPT | Performed by: STUDENT IN AN ORGANIZED HEALTH CARE EDUCATION/TRAINING PROGRAM

## 2025-07-30 PROCEDURE — 83735 ASSAY OF MAGNESIUM: CPT | Performed by: STUDENT IN AN ORGANIZED HEALTH CARE EDUCATION/TRAINING PROGRAM

## 2025-07-30 PROCEDURE — G0545 PR VISIT INHERENT TO INPT OR OBS CARE, INFECTIOUS DISEASE: HCPCS | Mod: ,,, | Performed by: STUDENT IN AN ORGANIZED HEALTH CARE EDUCATION/TRAINING PROGRAM

## 2025-07-30 PROCEDURE — 99233 SBSQ HOSP IP/OBS HIGH 50: CPT | Mod: GC,,, | Performed by: STUDENT IN AN ORGANIZED HEALTH CARE EDUCATION/TRAINING PROGRAM

## 2025-07-30 PROCEDURE — 36415 COLL VENOUS BLD VENIPUNCTURE: CPT | Performed by: STUDENT IN AN ORGANIZED HEALTH CARE EDUCATION/TRAINING PROGRAM

## 2025-07-30 PROCEDURE — 63600175 PHARM REV CODE 636 W HCPCS: Performed by: STUDENT IN AN ORGANIZED HEALTH CARE EDUCATION/TRAINING PROGRAM

## 2025-07-30 PROCEDURE — 86301 IMMUNOASSAY TUMOR CA 19-9: CPT

## 2025-07-30 PROCEDURE — 82977 ASSAY OF GGT: CPT | Performed by: STUDENT IN AN ORGANIZED HEALTH CARE EDUCATION/TRAINING PROGRAM

## 2025-07-30 PROCEDURE — 99232 SBSQ HOSP IP/OBS MODERATE 35: CPT | Mod: ,,,

## 2025-07-30 PROCEDURE — 85025 COMPLETE CBC W/AUTO DIFF WBC: CPT | Performed by: STUDENT IN AN ORGANIZED HEALTH CARE EDUCATION/TRAINING PROGRAM

## 2025-07-30 PROCEDURE — 80053 COMPREHEN METABOLIC PANEL: CPT | Performed by: STUDENT IN AN ORGANIZED HEALTH CARE EDUCATION/TRAINING PROGRAM

## 2025-07-30 RX ORDER — LACTULOSE 10 G/15ML
10 SOLUTION ORAL; RECTAL 2 TIMES DAILY
Qty: 900 ML | Refills: 1 | Status: SHIPPED | OUTPATIENT
Start: 2025-07-30 | End: 2025-07-30 | Stop reason: HOSPADM

## 2025-07-30 RX ORDER — GABAPENTIN 600 MG/1
300 TABLET ORAL 3 TIMES DAILY
Qty: 45 TABLET | Refills: 11 | Status: SHIPPED | OUTPATIENT
Start: 2025-07-30 | End: 2026-07-30

## 2025-07-30 RX ORDER — LEVOFLOXACIN 250 MG/1
750 TABLET, FILM COATED ORAL DAILY
Status: DISCONTINUED | OUTPATIENT
Start: 2025-07-30 | End: 2025-07-30 | Stop reason: HOSPADM

## 2025-07-30 RX ORDER — MUPIROCIN 20 MG/G
OINTMENT TOPICAL 2 TIMES DAILY
Qty: 22 G | Refills: 0 | Status: SHIPPED | OUTPATIENT
Start: 2025-07-30 | End: 2025-08-10

## 2025-07-30 RX ORDER — LEVOFLOXACIN 750 MG/1
750 TABLET, FILM COATED ORAL DAILY
Qty: 9 TABLET | Refills: 0 | Status: SHIPPED | OUTPATIENT
Start: 2025-07-30 | End: 2025-08-08

## 2025-07-30 RX ADMIN — PIPERACILLIN SODIUM AND TAZOBACTAM SODIUM 4.5 G: 4; .5 INJECTION, POWDER, FOR SOLUTION INTRAVENOUS at 09:07

## 2025-07-30 RX ADMIN — LEVOFLOXACIN 750 MG: 250 TABLET, FILM COATED ORAL at 12:07

## 2025-07-30 RX ADMIN — MUPIROCIN: 20 OINTMENT TOPICAL at 09:07

## 2025-07-30 RX ADMIN — LACTULOSE 10 G: 20 SOLUTION ORAL at 09:07

## 2025-07-30 RX ADMIN — PIPERACILLIN SODIUM AND TAZOBACTAM SODIUM 4.5 G: 4; .5 INJECTION, POWDER, FOR SOLUTION INTRAVENOUS at 03:07

## 2025-07-30 RX ADMIN — BUPROPION HYDROCHLORIDE 100 MG: 100 TABLET, FILM COATED ORAL at 09:07

## 2025-07-30 RX ADMIN — GABAPENTIN 600 MG: 300 CAPSULE ORAL at 09:07

## 2025-07-30 NOTE — ASSESSMENT & PLAN NOTE
Patient's most recent potassium results are listed below.   Recent Labs     07/28/25  0113 07/29/25  0557 07/30/25  0625   K 3.2* 3.7 3.7     - Replete potassium per protocol  - Monitor potassium Daily

## 2025-07-30 NOTE — TELEPHONE ENCOUNTER
----- Message from [employer] sent at 7/30/2025  1:35 PM CDT -----    Hepatology referral received and scanned into media; pt chart sent to referral nurse for medical review.       Referring Provider: Arnold Browning MD  Phone: [phone]  Fax: [phone]  .   [id number]

## 2025-07-30 NOTE — ASSESSMENT & PLAN NOTE
Patient has sepsis without organ dysfunction secondary to e coli bacteremia, likely GI in origin. A review of systems was completed. Patient's sepsis is improving    Current Antibiotics    piperacillin-tazobactam (ZOSYN) 4.5 g in D5W 100 mL IVPB (MB+), Every 8 hours (non-standard times), Intravenous    Lactate  Recent Labs   Lab 07/25/25  1639 07/28/25  0113   LACTATE 1.4 1.5     Culture Data  Blood Cultures   Blood Culture   Date Value Ref Range Status   07/28/2025 No Growth After 48 Hours  Preliminary   07/28/2025 No Growth After 48 Hours  Preliminary      mSOFA  MSOFA Total  Min: 3   Min taken time: 07/30/25 1101  Max: 5   Max taken time: 07/29/25 1501    - Antibiotics as listed above  - Fluid resuscitation as follows: performed at OSH, no further needed at this time.  - Follow up culture data and evaluate for more definitive source (cholangitis vs SBP).  - Vasopressors were not needed  - The following services were consulted: hepatology, AES.

## 2025-07-30 NOTE — ASSESSMENT & PLAN NOTE
Co-morbidities are present and inclusive of ascites, portal hypertension, malnutrition, anemia/pancytopenia, and anticoagulation.  MELD-Na score calculated; MELD 3.0: 18 at 7/30/2025  6:25 AM  MELD-Na: 14 at 7/30/2025  6:25 AM  Calculated from:  Serum Creatinine: 0.6 mg/dL (Using min of 1 mg/dL) at 7/30/2025  6:25 AM  Serum Sodium: 140 mmol/L (Using max of 137 mmol/L) at 7/30/2025  6:25 AM  Total Bilirubin: 5.8 mg/dL at 7/30/2025  6:25 AM  Serum Albumin: 1.8 g/dL at 7/30/2025  6:25 AM  INR(ratio): 1.1 at 7/30/2025  6:25 AM  Age at listing (hypothetical): 65 years  Sex: Male at 7/30/2025  6:25 AM      Continue chronic meds. Etiology thought to be likely ETOH although with also known hx of UC. NAIF checked at OSH which was negative. Will avoid any hepatotoxic meds, and monitor CBC/CMP/INR for synthetic function. Consult hepatology and AES.

## 2025-07-30 NOTE — ASSESSMENT & PLAN NOTE
Likely multifactorial in setting of decompensated cirrhosis and biliary obstruction with infection. Further discussion and plan as in related problems.  Recent Labs     07/28/25  0113 07/29/25  0557 07/30/25  0625   BILITOT 9.0* 6.6* 5.8*   * 233* 195*   * 112* 106*   ALKPHOS 671* 712* 653*    monitor

## 2025-07-30 NOTE — PLAN OF CARE
- Patient admitted 7/28/25 with abdominal pain and E coli bacteremia.  - Patient underwent paracentesis 7/28 with 1.5 L fluid removal.  - Yesterday (7/29), patient went for EGD and ERCP - stent placed. Normal findings documented for EGD.  - Overnight, patient endorsed moderately severe abdominal pain. Patient declined PRN Tylenol; no other pain medications available. Patient suspects abdominal pain is gas discomfort +/- redeveloping ascites. Post-ERCP pancreatitis seems less likely as patient has been tolerating PO intake well overnight.  - IV Abx: Zosyn q8h.  - Urine output= 150 mL + 1 unmeasured occurrence into toilet.  - Daughter at bedside.  - Patient took a shower prior to bedtime.

## 2025-07-30 NOTE — ASSESSMENT & PLAN NOTE
Likely reactive in setting of infection/inflammation with ongoing GI issues. Continue to monitor.    Recent Labs   Lab 07/28/25  0113 07/29/25  0557 07/30/25  0625   * 511* 489*   monitor

## 2025-07-30 NOTE — ASSESSMENT & PLAN NOTE
Likely in setting of known liver dz and probable nutritional deficiencies. Most recent hemoglobin and hematocrit are listed below.  Recent Labs     07/28/25  0113 07/29/25  0557 07/30/25  0625   HGB 11.0* 9.9* 9.5*   HCT 34.3* 31.4* 30.0*     Plan  - Monitor serial CBC: Daily  - Order iron studies and vitamin levels for evaluation.   - Transfuse PRBC if patient becomes hemodynamically unstable, symptomatic or H/H drops below 7/21.  - Patient has not received any PRBC transfusions to date  - Patient's anemia is currently stable

## 2025-07-30 NOTE — SUBJECTIVE & OBJECTIVE
Interval History:   Chief Complaint  Follow-up after ERCP procedure, improvement in jaundice, resolution of abdominal pressure    History of Present Illness  He underwent an ERCP yesterday and reports feeling better overall since the procedure.He states that he is experiencing less pressure and the fluid seems to be subsiding. He notes improvement in his jaundice, mentioning that he looked less yellow when brushing his teeth this morning. The patient denies any nausea and reports that his appetite is normal. He continues to have soreness at the end of his ribs on the side where he had broken ribs from a previous incident.   The patient reports no issues with his current antibiotic treatment, stating that the antibiotics make him feel good. He denies experiencing diarrhea or any other side effects from the antibiotics. Regarding his IV access, he has no current issues.     Review of Systems   Constitutional:  Negative for chills and fever.   Respiratory:  Negative for cough and shortness of breath.    Cardiovascular:  Negative for chest pain and leg swelling.   Gastrointestinal:  Negative for abdominal pain, constipation, diarrhea, nausea and vomiting.   Endocrine: Negative for polyuria.   Genitourinary:  Negative for dysuria.   Musculoskeletal:  Negative for back pain and myalgias.   Skin:  Negative for rash and wound.   Neurological:  Negative for dizziness and headaches.     Objective:     Vital Signs (Most Recent):  Temp: 98.8 °F (37.1 °C) (07/30/25 1128)  Pulse: 73 (07/30/25 1141)  Resp: 16 (07/30/25 1128)  BP: 134/78 (07/30/25 1128)  SpO2: 98 % (07/30/25 1128) Vital Signs (24h Range):  Temp:  [97.7 °F (36.5 °C)-98.9 °F (37.2 °C)] 98.8 °F (37.1 °C)  Pulse:  [64-91] 73  Resp:  [16-23] 16  SpO2:  [96 %-100 %] 98 %  BP: ()/(60-78) 134/78     Weight: 67.4 kg (148 lb 7.7 oz)  Body mass index is 22.91 kg/m².    Estimated Creatinine Clearance: 116.8 mL/min (based on SCr of 0.6 mg/dL).     Physical Exam      Significant Labs: All pertinent labs within the past 24 hours have been reviewed.    Significant Imaging: I have reviewed all pertinent imaging results/findings within the past 24 hours.    - ERCP (7/29/2025):    - Normal esophagus, stomach, and duodenum    - Hyperechoic material consistent with sludge visualized in lower third of main bile duct and gallbladder body wall    - Single localized biliary stricture found in lower main bile duct    - Cells obtained from lower third of main bile duct for cytology    - Stent placed

## 2025-07-30 NOTE — ASSESSMENT & PLAN NOTE
Patient's blood pressure range in the last 24 hours was: BP  Min: 95/60  Max: 135/77.The patient's inpatient anti-hypertensive regimen is listed below:  Current Antihypertensives       Plan  - Hold home regimen for now. Monitor and adjust PRN.

## 2025-07-30 NOTE — PROGRESS NOTES
[facility] - [facility]  Infectious Disease  Progress Note    Patient Name: Eric Ma  MRN: [id number]  Admission Date: 7/28/2025  Length of Stay: 2 days  Attending Physician: Lake Jones MD  Primary Care Provider: Adelita Hernandez NP    Isolation Status: No active isolations  Assessment/Plan:      ID  * E coli bacteremia  Blood Cx from outside hospital from 7/25 were positive.  Secondary to intra-abdominal source. Now S/p ERCP. Qtc not prolonged.  Blood cultures from 7/28 have no growth to date  Ascitic fluid does not reveal evidence of SBP  Transition from zosyn to Levofloxacin 750 mg daily  Finish a 7 day course (End date 8/4)      Anticipated Disposition: Final recommendations listed above. Management was discussed with the team.    Thank you for your consult. ID will sign off. Please contact us if you have any additional questions.    Lalitha Thomas MD  Infectious Disease  [facility] - [facility]    Subjective:     Principal Problem:E coli bacteremia    Interval History:   He underwent an ERCP yesterday and reports feeling better overall since the procedure.He states that he is experiencing less pressure and the fluid seems to be subsiding. He notes improvement in his jaundice, mentioning that he looked less yellow when brushing his teeth this morning. The patient denies any nausea and reports that his appetite is normal. He continues to have soreness at the end of his ribs on the side where he had broken ribs from a previous incident.   The patient reports no issues with his current antibiotic treatment, stating that the antibiotics make him feel good. He denies experiencing diarrhea or any other side effects from the antibiotics. Regarding his IV access, he has no current issues.     Review of Systems   Constitutional:  Negative for chills and fever.   Respiratory:  Negative for cough and shortness of breath.    Cardiovascular:  Negative for chest pain and leg swelling.    Gastrointestinal:  Negative for abdominal pain, constipation, diarrhea, nausea and vomiting.   Endocrine: Negative for polyuria.   Genitourinary:  Negative for dysuria.   Musculoskeletal:  Negative for back pain and myalgias.   Skin:  Negative for rash and wound.   Neurological:  Negative for dizziness and headaches.     Objective:     Vital Signs (Most Recent):  Temp: 98.8 °F (37.1 °C) (07/30/25 1128)  Pulse: 73 (07/30/25 1141)  Resp: 16 (07/30/25 1128)  BP: 134/78 (07/30/25 1128)  SpO2: 98 % (07/30/25 1128) Vital Signs (24h Range):  Temp:  [97.7 °F (36.5 °C)-98.9 °F (37.2 °C)] 98.8 °F (37.1 °C)  Pulse:  [64-91] 73  Resp:  [16-23] 16  SpO2:  [96 %-100 %] 98 %  BP: ()/(60-78) 134/78     Weight: 67.4 kg (148 lb 7.7 oz)  Body mass index is 22.91 kg/m².    Estimated Creatinine Clearance: 116.8 mL/min (based on SCr of 0.6 mg/dL).     Physical Exam     Significant Labs: All pertinent labs within the past 24 hours have been reviewed.    Significant Imaging: I have reviewed all pertinent imaging results/findings within the past 24 hours.

## 2025-07-30 NOTE — ASSESSMENT & PLAN NOTE
ERCP 7/29- a single localized biliary stricture was found in the lower third of the main bile duct. The stricture was indeterminate. Cells for cytology obtained in the lower third of the main duct    - Patient is feeling well post procedure. Denies abdominal pain, NV, fever, chills. Tolerating diet.   -LFTs with Tbili 5.8 (from 6.6),  (from 712),  (from 233),  (from 112). WBC wnl. H/H stable. Afebrile   -Repeat ERCP in 6 weeks for stent exchange/removal  -Await cytology results  -Rest of patient's care per primary team  -AES will sign off at this time; please reach out with any further questions/concerns

## 2025-07-30 NOTE — DISCHARGE SUMMARY
[facility] - Transplant [facility] Medicine  Discharge Summary      Patient Name: Eric Ma  MRN: [id number]  DAVID: [id number]  Patient Class: IP- Inpatient  Admission Date: 7/28/2025  Hospital Length of Stay: 2 days  Discharge Date and Time: 07/30/2025 7:51 AM  Attending Physician: Lake Jones MD   Discharging Provider: Lake Jones MD  Primary Care Provider: Adelita Hernandez NP  Hospital Medicine Team: [facility] [place] [facility] MED  Lake Jones MD  Primary Care Team: [facility] [place] [facility]     HPI:   Eric Ma is 65 y.o. male with history of alcohol use disorder (quit 04/2025, intermittently sober over the last 2yrs), decompensated cirrhosis, UC, HTN, HLD, and depression who presents as a transfer for evaluation of biliary obstruction and decompensated cirrhosis with sepsis 2/2 e coli bacteremia concerning for ascending cholangitis.     Pt initially dx with cirrhosis thought to be alcoholic in nature in 04/2024 while following with his GI outpatient. Elevated LFTs, hyperbili noted initially 6/30 for which pt was admitted to OSH at that time with workup showing mild biliary duct dilation. MRCP performed at that time that did not show choledocholithiasis or significant ductal dilation and pt's LFTs remained elevated but bili downtrended. Pt discharged with GI follow up. However pt with persistent abd pain, distention, and jaundice as well as fever/chills so represented with reoccurrence of worsening cholestatic pattern of labs as well as elevated WBC to 18k and procal 1.2. CBD dilation noted again on U/S as well as moderate ascites at OSH 7/25 w/o evidence of choledocholithiasis. Pt discussed with GI at [facility] who recommended transfer for evaluation with hepatology and other services. Pt also found to have e coli bacteremia during workup while awaiting transfer for which he was started on zosyn with improvement in WBC and procal as well as symptomatically.    At time of admit evaluation, pt reports that  he overall feels improved. His fever/chills and abd pain have improved/nearly resolved. His abd distention remains stable. He notes some mild BL LE edema that he states occurred during transport. He reports that he has chronic left sided chest/rib soreness from a prior crush injury which required hardware implantation which is stable. He otherwise denies URI sxs, cough, dyspnea, hematochezia/melena, constipation, diarrhea, nausea, vomiting, decreased UOP, dysuria, dizziness, syncope, confusion.          Procedure(s) (LRB):  ERCP (ENDOSCOPIC RETROGRADE CHOLANGIOPANCREATOGRAPHY) (N/A)  ULTRASOUND, UPPER GI TRACT, ENDOSCOPIC      Hospital Course:   7/28 E coli bacteremia - Likely GI in origin given his cirrhosis/biliary obstruction.  Continue IV zosyn.Repeat BC pending. US Liver - cirrhotic liver morphology. and measures 14.1 cm in the right midclavicular line. There is no evidence of hepatic mass. There is hepatopedal flow in the main portal vein. Moderate ascites. common bile duct is mildly dilated to 9 mm in diameter. Echogenic material in the common bile duct. No evidence of gallstones. Gallbladder wall thickening is nonspecific in the setting of ascites. Choledocholithiasis cannot be excluded on the basis of this exam. Consider MRCP.  Hepatology and AES consulted - Plan for ERCP tomorrow. NPO at midnight. IR consulted for paracentesis.1500 ml removed. labs pending. K replaced . bilateral feet to be purple and cool to touch. denies pain or claudication. Neurovascular checks. arterial US LE ordered  7/29 no evidence of SBP . ERCP today. ID eval - continue IV zosyn . LE arterial US - Right Leg: Segmental pressures suggest medial calcinosis. PVR waveforms suggest no evidence of significant PAOD.   Left Leg: Segmental pressures suggest medial calcinosis. PVR waveforms suggest no evidence of significant PAOD.   7/30 EUS                   - Normal esophagus.                          - Normal stomach.                           - Normal examined duodenum.                          - Hyperechoic material consistent with sludge was                          visualized endosonographically in the lower third                          of the main bile duct.                          - Hyperechoic material consistent with sludge was                          visualized endosonographically in the gallbladder                          body.                          - No specimens collected.    Possible passed stone along with sludge . resumed diet   ERCP -single localized biliary stricture was found  in the lower third of the main bile yohannes s/p stent  Cells for cytology obtained in the lower third of the main duct.Repeat ERCP in 6 weeks to exchange stent.   BC x2 7/28 NGTD. TBI of 0 with a Great toe pressure of 0 mmHg is noted RLE but asymptomatic.vascular surgery  recommends outpatient follow up.  CA 19-9. ID eval - transition levoquin for a 7 day course ( end date 8/8/25). Discharge home today      Goals of Care Treatment Preferences:  Code Status: Full Code         Consults:   Consults (From admission, onward)          Status Ordering Provider     Inpatient consult to Infectious Diseases  Once        Provider:  (Not yet assigned)    Completed ITZ FENTON     Inpatient consult to [facility] ([facility]  Once        Provider:  (Not yet assigned)    Completed ITZ FENTON     Inpatient consult to Hepatology  Once        Provider:  (Not yet assigned)    Completed ITZ FENTON            Assessment & Plan  E coli bacteremia  Likely GI in origin given his cirrhosis/biliary obstruction although evidence of definitive source likely  - Continue zosyn.  - Repeat Bcx here and follow up OSH culture data.  - Consult hepatology and AES. May also need IR vs med consult team for paracentesis to evaluate ascitic fluid.  7/28 E coli bacteremia - Likely GI in origin given his cirrhosis/biliary obstruction.  Continue IV zosyn.Repeat BC  pending. US Liver - cirrhotic liver morphology. and measures 14.1 cm in the right midclavicular line. There is no evidence of hepatic mass. There is hepatopedal flow in the main portal vein. Moderate ascites. common bile duct is mildly dilated to 9 mm in diameter. Echogenic material in the common bile duct. No evidence of gallstones. Gallbladder wall thickening is nonspecific in the setting of ascites. Choledocholithiasis cannot be excluded on the basis of this exam. Consider MRCP.  Hepatology and AES consulted. IR consulted for paracentesis.K replaced     Sepsis 2/2 e coli bacteremia  Patient has sepsis without organ dysfunction secondary to e coli bacteremia, likely GI in origin. A review of systems was completed. Patient's sepsis is improving    Current Antibiotics    levoFLOXacin tablet 750 mg, Daily, Oral  levoFLOXacin (LEVAQUIN) tablet, Daily, Oral    Lactate  Recent Labs   Lab 07/25/25  1639 07/28/25  0113   LACTATE 1.4 1.5     Culture Data  Blood Cultures   Blood Culture   Date Value Ref Range Status   07/28/2025 No Growth After 48 Hours  Preliminary   07/28/2025 No Growth After 48 Hours  Preliminary      mSOFA  MSOFA Total  Min: 3   Min taken time: 07/30/25 1201  Max: 5   Max taken time: 07/29/25 1501    - Antibiotics as listed above  - Fluid resuscitation as follows: performed at OSH, no further needed at this time.  - Follow up culture data and evaluate for more definitive source (cholangitis vs SBP).  - Vasopressors were not needed  - The following services were consulted: hepatology, AES.    Biliary obstruction  Elevated LFTs, hyperbili noted initially 6/30 for which pt was admitted to OSH at that time with workup showing mild biliary duct dilation. MRCP performed at that time that did not show choledocholithiasis or significant ductal dilation and pt's LFTs remained elevated but bili downtrended. Pt discharged with GI follow up. However pt with persistent abd pain and jaundice as well as fever so  represented with reoccurrence of worsening cholestatic pattern of labs. CBD dilation noted again on U/S at OSH 7/25 w/o evidence of choledocholithiasis. Found to have e coli bacteremia from uncertain source although likely GI source (cholangitis vs SBP vs other).    - NPO for now for possible procedure.   - Consult AES.  - Continue zosyn and follow up OSH culture data.  - Daily labs to include CMP with fractionated bili, coags, GGT.    7/28 E coli bacteremia - Likely GI in origin given his cirrhosis/biliary obstruction.  Continue IV zosyn.Repeat BC pending. US Liver - cirrhotic liver morphology. and measures 14.1 cm in the right midclavicular line. There is no evidence of hepatic mass. There is hepatopedal flow in the main portal vein. Moderate ascites. common bile duct is mildly dilated to 9 mm in diameter. Echogenic material in the common bile duct. No evidence of gallstones. Gallbladder wall thickening is nonspecific in the setting of ascites. Choledocholithiasis cannot be excluded on the basis of this exam. Consider MRCP.  Hepatology and AES consulted. IR consulted for paracentesis.K replaced   7/30 ID eval - transition levoquin for a 7 day course ( end date 8/8/25). Discharge home today        Transaminitis  Likely multifactorial in setting of decompensated cirrhosis and biliary obstruction with infection. Further discussion and plan as in related problems.  Recent Labs     07/28/25  0113 07/29/25  0557 07/30/25  0625   BILITOT 9.0* 6.6* 5.8*   * 233* 195*   * 112* 106*   ALKPHOS 671* 712* 653*    monitor     Hyperbilirubinemia  Likely multifactorial in setting of decompensated cirrhosis and biliary obstruction with infection. Further discussion and plan as in related problems.   Patients liver enzymes  elevated.   Recent Labs     07/28/25  0113 07/29/25  0557 07/30/25  0625   BILITOT 9.0* 6.6* 5.8*   * 233* 195*   * 112* 106*   ALKPHOS 671* 712* 653*   monitor     Decompensated  cirrhosis  Co-morbidities are present and inclusive of ascites, portal hypertension, malnutrition, anemia/pancytopenia, and anticoagulation.  MELD-Na score calculated; MELD 3.0: 18 at 7/30/2025  6:25 AM  MELD-Na: 14 at 7/30/2025  6:25 AM  Calculated from:  Serum Creatinine: 0.6 mg/dL (Using min of 1 mg/dL) at 7/30/2025  6:25 AM  Serum Sodium: 140 mmol/L (Using max of 137 mmol/L) at 7/30/2025  6:25 AM  Total Bilirubin: 5.8 mg/dL at 7/30/2025  6:25 AM  Serum Albumin: 1.8 g/dL at 7/30/2025  6:25 AM  INR(ratio): 1.1 at 7/30/2025  6:25 AM  Age at listing (hypothetical): 65 years  Sex: Male at 7/30/2025  6:25 AM      Continue chronic meds. Etiology thought to be likely ETOH although with also known hx of UC. NAIF checked at OSH which was negative. Will avoid any hepatotoxic meds, and monitor CBC/CMP/INR for synthetic function. Consult hepatology and AES.  Ulcerative colitis  Followed by GI outpatient, on mesalamine.    Plan  - Hold mesalamine.    HTN (hypertension)  Patient's blood pressure range in the last 24 hours was: BP  Min: 95/60  Max: 135/77.The patient's inpatient anti-hypertensive regimen is listed below:  Current Antihypertensives       Plan  - Hold home regimen for now. Monitor and adjust PRN.    HLD (hyperlipidemia)    - Hold statin in setting of elevated LFTs.    Alcohol use  Pt reports he has been trying to quit over the last 2 yrs and had intermittent sobriety. He states he last drank in 04/2025. He denies any history of alcohol withdrawal.    Plan  - CIWA q4 for now, likely able to discontinue pending stability. Likely low risk for withdrawal at this point.  - PETH ordered.  - Continue to  on and encourage cessation.    Normocytic anemia  Likely in setting of known liver dz and probable nutritional deficiencies. Most recent hemoglobin and hematocrit are listed below.  Recent Labs     07/28/25  0113 07/29/25  0557 07/30/25  0625   HGB 11.0* 9.9* 9.5*   HCT 34.3* 31.4* 30.0*     Plan  - Monitor serial  CBC: Daily  - Order iron studies and vitamin levels for evaluation.   - Transfuse PRBC if patient becomes hemodynamically unstable, symptomatic or H/H drops below 7/21.  - Patient has not received any PRBC transfusions to date  - Patient's anemia is currently stable  Thrombocytosis  Likely reactive in setting of infection/inflammation with ongoing GI issues. Continue to monitor.    Recent Labs   Lab 07/28/25  0113 07/29/25  0557 07/30/25  0625   * 511* 489*   monitor   Depression    - Continue home wellbutrin.      Hypokalemia  Patient's most recent potassium results are listed below.   Recent Labs     07/28/25  0113 07/29/25  0557 07/30/25  0625   K 3.2* 3.7 3.7     - Replete potassium per protocol  - Monitor potassium Daily  Leucocytosis  Leucocytosis  Patient with leucocytosis   Recent Labs   Lab 07/28/25  0113 07/29/25  0557 07/30/25  0625   WBC 13.61* 10.24 12.42     . Afebrile. BCX 2 pending . likely secondary to sepsis.  PAD (peripheral artery disease)        Final Active Diagnoses:    Diagnosis Date Noted POA    PRINCIPAL PROBLEM:  E coli bacteremia [R78.81, B96.20] 07/28/2025 Yes    PAD (peripheral artery disease) [I73.9] 07/29/2025 Yes    Decompensated cirrhosis [K72.90, K74.60] 07/28/2025 Yes    HTN (hypertension) [I10] 07/28/2025 Yes    HLD (hyperlipidemia) [E78.5] 07/28/2025 Yes    Sepsis 2/2 e coli bacteremia [A41.9] 07/28/2025 Yes    Biliary obstruction [K83.1] 07/28/2025 Yes    Hyperbilirubinemia [E80.6] 07/28/2025 Yes    Alcohol use [F10.90] 07/28/2025 Yes    Normocytic anemia [D64.9] 07/28/2025 Yes    Thrombocytosis [D75.839] 07/28/2025 Yes    Depression [F32.A] 07/28/2025 Yes    Hypokalemia [E87.6] 07/28/2025 Yes    Leucocytosis [D72.829] 07/28/2025 Yes    Transaminitis [R74.01] 10/01/2021 Yes    Ulcerative colitis [K51.90] 09/30/2021 Yes      Problems Resolved During this Admission:       Discharged Condition: fair    Disposition: Home or Self Carehome     Follow Up:    Patient  Instructions:      Ambulatory referral/consult to Hepatology   Standing Status: Future   Referral Priority: Routine Referral Type: Consultation   Referral Reason: Specialty Services Required   Number of Visits Requested: 1     Ambulatory referral/consult to Infectious Disease   Standing Status: Future   Referral Priority: Routine Referral Type: Consultation   Referral Reason: Specialty Services Required   Requested Specialty: Infectious Diseases   Number of Visits Requested: 1     Ambulatory referral/consult to Gastroenterology   Standing Status: Future   Referral Priority: Routine Referral Type: Consultation   Referral Reason: Specialty Services Required   Requested Specialty: Gastroenterology   Number of Visits Requested: 1     Ambulatory referral/consult to Vascular Surgery   Standing Status: Future   Referral Priority: Routine Referral Type: Consultation   Referral Reason: Specialty Services Required   Requested Specialty: Vascular Surgery   Number of Visits Requested: 1       Significant Diagnostic Studies: Labs: BMP:   Recent Labs   Lab 07/29/25  0557 07/30/25  0625   GLU 88 91    140   K 3.7 3.7    109   CO2 23 24   BUN 13 13   CREATININE 0.5 0.6   CALCIUM 8.2* 7.9*   MG 1.9 1.9   , CMP   Recent Labs   Lab 07/29/25  0557 07/30/25  0625    140   K 3.7 3.7    109   CO2 23 24   GLU 88 91   BUN 13 13   CREATININE 0.5 0.6   CALCIUM 8.2* 7.9*   PROT 5.9* 5.9*   ALBUMIN 1.7* 1.8*   BILITOT 6.6* 5.8*   ALKPHOS 712* 653*   * 195*   * 106*   ANIONGAP 8 7*   , CBC   Recent Labs   Lab 07/29/25  0557 07/30/25  0625   WBC 10.24 12.42   HGB 9.9* 9.5*   HCT 31.4* 30.0*   * 489*   , INR   Lab Results   Component Value Date    INR 1.1 07/30/2025    INR 1.1 07/29/2025    INR 1.2 07/28/2025    PROTIME 12.3 07/30/2025    PROTIME 12.2 07/29/2025    PROTIME 12.7 (H) 07/28/2025   , Lipid Panel   Lab Results   Component Value Date    CHOL 167 10/01/2021    HDL 45 10/01/2021    LDLCALC  "112.6 10/01/2021    TRIG 47 10/01/2021    CHOLHDL 26.9 10/01/2021   , Troponin No results for input(s): "TROPONINI" in the last 168 hours., A1C: No results for input(s): "HGBA1C" in the last 4320 hours., and All labs within the past 24 hours have been reviewed  Microbiology: Blood Culture No results found for: "LABBLOO", Sputum Culture No results found for: "GSRESP", "RESPIRATORYC", and Urine Culture  No results found for: "LABURIN"    US Endoscopic Ultrasound  See OP Notes for results.     IMPRESSION: See OP Notes for results.     This procedure was auto-finalized by: Virtual Radiologist  FL ERCP Biliary And Pancreatic  See OP Notes for results.     IMPRESSION: See OP Notes for results.     This procedure was auto-finalized by: Virtual Radiologist  VAS US Arterial Legs Bilateral  Indication  ========    Cold Feet and Digits    Pressure Lower  ============    Rt brachial A syst BP  139 mmHg  Rt low thigh BP    255 mmHg  Rt calf  mmHg  Rt PTA BP  191 mmHg  Rt dors pedis A  mmHg  Rt toe BP  0 mmHg  Right JOSE ratio use:   post. tibial  Rt JOSE post tibial (rt post tib A BP / max brach A BP) 1.37  Rt JOSE (rt dors ped A BP / max brach A BP) 1.28  Rt ankle BP / max brach A BP   1.37  Rt TBI (rt toe BP / max brach A BP)    0.00  Lt low thigh BP    255 mmHg  Lt calf  mmHg  Lt PTA BP  175 mmHg  Lt dors pedis A  mmHg  Lt toe BP  89 mmHg  Left JOSE ratio use:    post. tibial  Lt JOSE (lt post tibial A BP / max brach A BP)  1.26  Lt JOSE dors ped (lt dors ped A BP / max brach A BP)    0.83  Lt ankle BP / max brach A BP   1.26  Lt TBI (lt toe BP / max brach A BP)    0.64  PAD severity based on JOSE on right:    no evidence of significant PAOD  PAD severity based on JOSE left:    no evidence of significant PAOD    PPG Lower  =========    Rt toe BP - PPG    0 mmHg  Rt toe digit waveform: absent  Lt toe BP - PPG    89 mmHg  Lt toe digit waveform: moderately dampened    Comment  ========    Ankle-Brachial Indices " that exceed 1.3 are considered evidence of dense, calcified plaque. The presence of artifactually elevated  pressures in arteries of the lower extremity can suggest medial calcinosis; therefore, toe pressures and toe brachial indices are  obtained.    Impression  =========    Right Leg: Segmental pressures suggest medial calcinosis. PVR waveforms suggest no evidence of significant PAOD.  Rt lower digits: Toe PPG waveforms as described above. - TBI of 0 with a Great toe pressure of 0 mmHg is noted.    Left Leg: Segmental pressures suggest medial calcinosis. PVR waveforms suggest no evidence of significant PAOD.  Lt lower digits: Toe PPG waveforms as described above. - TBI of 0.64 with a Great toe pressure of 89 mmHg is noted.    DATE OF SERVICE: 07/29/2025                                                      Sonographer: Shirley Guzman  Electronically Signed by: RODERICK Diane M.D. at 07/29/2025-10:30       Pending Diagnostic Studies:       Procedure Component Value Units Date/Time    EXTRA TUBES [[id number]]     Order Status: Sent Lab Status: No result     Specimen: Blood, Venous     Narrative:      The following orders were created for panel order EXTRA TUBES.  Procedure                               Abnormality         Status                     ---------                               -----------         ------                     Lavender Top Hold[[id number]]                                                            Please view results for these tests on the individual orders.    Lavender Top Hold [[id number]]     Order Status: Sent Lab Status: No result     Specimen: Blood, Venous            Medications:  Reconciled Home Medications:      Medication List        START taking these medications      levoFLOXacin 750 MG tablet  Commonly known as: LEVAQUIN  Take 1 tablet (750 mg total) by mouth once daily. for 9 days     mupirocin 2 % ointment  Commonly known as: BACTROBAN  by Nasal route 2 (two) times daily. for 3  days            CONTINUE taking these medications      buPROPion 100 MG TBSR 12 hr tablet  Commonly known as: WELLBUTRIN SR  Take 100 mg by mouth 2 (two) times daily.     cholecalciferol (vitamin D3) 50 mcg (2,000 unit) Cap capsule  Commonly known as: VITAMIN D3  Take 1 capsule (2,000 Units total) by mouth once daily.     gabapentin 600 MG tablet  Commonly known as: NEURONTIN  Take 0.5 tablets (300 mg total) by mouth 3 (three) times daily.     mesalamine 400 mg Cdti cdti capsule  Commonly known as: DELZICOL  Take 2 capsules (800 mg total) by mouth 3 (three) times daily.            STOP taking these medications      atorvastatin 40 MG tablet  Commonly known as: LIPITOR     isosorbide mononitrate 30 MG 24 hr tablet  Commonly known as: IMDUR     lisinopriL 5 MG tablet  Commonly known as: PRINIVIL,ZESTRIL     nicotine 7 mg/24 hr  Commonly known as: NICODERM CQ     tiZANidine 4 MG tablet  Commonly known as: ZANAFLEX              Indwelling Lines/Drains at time of discharge:   Lines/Drains/Airways       None                40  minutes of time spent on discharge, including examining the patient, providing discharge instructions, arranging   follow up and documentation        Lake Jones MD  Attending Staff Physician  [facility] Medicine  pager- [phone]  Spectralzqx - [phone]

## 2025-07-30 NOTE — PROGRESS NOTES
Brice Carlos - Transplant [facility] Medicine  Progress Note    Patient Name: Eric Ma  MRN: [id number]  Patient Class: IP- Inpatient   Admission Date: 7/28/2025  Length of Stay: 2 days  Attending Physician: Lake Jones MD  Primary Care Provider: Adelita Hernandez NP        Subjective     Principal Problem:E coli bacteremia        HPI:  Eric Ma is 65 y.o. male with history of alcohol use disorder (quit 04/2025, intermittently sober over the last 2yrs), decompensated cirrhosis, UC, HTN, HLD, and depression who presents as a transfer for evaluation of biliary obstruction and decompensated cirrhosis with sepsis 2/2 e coli bacteremia concerning for ascending cholangitis.     Pt initially dx with cirrhosis thought to be alcoholic in nature in 04/2024 while following with his GI outpatient. Elevated LFTs, hyperbili noted initially 6/30 for which pt was admitted to OSH at that time with workup showing mild biliary duct dilation. MRCP performed at that time that did not show choledocholithiasis or significant ductal dilation and pt's LFTs remained elevated but bili downtrended. Pt discharged with GI follow up. However pt with persistent abd pain, distention, and jaundice as well as fever/chills so represented with reoccurrence of worsening cholestatic pattern of labs as well as elevated WBC to 18k and procal 1.2. CBD dilation noted again on U/S as well as moderate ascites at OSH 7/25 w/o evidence of choledocholithiasis. Pt discussed with GI at [facility] who recommended transfer for evaluation with hepatology and other services. Pt also found to have e coli bacteremia during workup while awaiting transfer for which he was started on zosyn with improvement in WBC and procal as well as symptomatically.    At time of admit evaluation, pt reports that he overall feels improved. His fever/chills and abd pain have improved/nearly resolved. His abd distention remains stable. He notes some mild BL LE edema  that he states occurred during transport. He reports that he has chronic left sided chest/rib soreness from a prior crush injury which required hardware implantation which is stable. He otherwise denies URI sxs, cough, dyspnea, hematochezia/melena, constipation, diarrhea, nausea, vomiting, decreased UOP, dysuria, dizziness, syncope, confusion.          Overview/Hospital Course:  7/28 E coli bacteremia - Likely GI in origin given his cirrhosis/biliary obstruction.  Continue IV zosyn.Repeat BC pending. US Liver - cirrhotic liver morphology. and measures 14.1 cm in the right midclavicular line. There is no evidence of hepatic mass. There is hepatopedal flow in the main portal vein. Moderate ascites. common bile duct is mildly dilated to 9 mm in diameter. Echogenic material in the common bile duct. No evidence of gallstones. Gallbladder wall thickening is nonspecific in the setting of ascites. Choledocholithiasis cannot be excluded on the basis of this exam. Consider MRCP.  Hepatology and AES consulted - Plan for ERCP tomorrow. NPO at midnight. IR consulted for paracentesis.1500 ml removed. labs pending. K replaced . bilateral feet to be purple and cool to touch. denies pain or claudication. Neurovascular checks. arterial US LE ordered  7/29 no evidence of SBP . ERCP today. ID eval - continue IV zosyn . LE arterial US - Right Leg: Segmental pressures suggest medial calcinosis. PVR waveforms suggest no evidence of significant PAOD.   Left Leg: Segmental pressures suggest medial calcinosis. PVR waveforms suggest no evidence of significant PAOD.   7/30 EUS                   - Normal esophagus.                          - Normal stomach.                          - Normal examined duodenum.                          - Hyperechoic material consistent with sludge was                          visualized endosonographically in the lower third                          of the main bile duct.                          - Hyperechoic  material consistent with sludge was                          visualized endosonographically in the gallbladder                          body.                          - No specimens collected.    Possible passed stone along with sludge . resumed diet   ERCP -single localized biliary stricture was found  in the lower third of the main bile yohannes s/p stent  Cells for cytology obtained in the lower third of the main duct.Repeat ERCP in 6 weeks to exchange stent.   BC x2 7/28 NGTD. TBI of 0 with a Great toe pressure of 0 mmHg is noted RLE but asymptomatic.vascular surgery  recommends outpatient follow up.  CA 19-9. ID eval - transition levoquin for a 7 day course ( end date 8/8/25). Discharge home today         Review of Systems:   Pain scale:  Constitutional:  fever,  chills, headache, vision loss, hearing loss, weight loss, Generalized weakness, falls, loss of smell, loss of taste, poor appetite,  sore throat  Respiratory: cough, shortness of breath.   Cardiovascular: chest pain, dizziness, palpitations, orthopnea, swelling of feet, syncope  Gastrointestinal: nausea, vomiting, abdominal pain, diarrhea, black stool,  blood in stool, change in bowel habits, constipation  Genitourinary: hematuria, dysuria, urgency, frequency  Integument/Breast: rash,  pruritis  Hematologic/Lymphatic: easy bruising, lymphadenopathy  Musculoskeletal: arthralgias , myalgias, back pain, neck pain, knee pain  Neurological: confusion, seizures, tremors, slurred speech  Behavioral/Psych:  depression, anxiety, auditory or visual hallucinations     OBJECTIVE:     Physical Exam:  Body mass index is 22.91 kg/m².    Constitutional: Appears well-developed and well-nourished.   Head: Normocephalic and atraumatic. +icterus  Neck: Normal range of motion. Neck supple.   Cardiovascular: Normal heart rate.  Regular heart rhythm.  Pulmonary/Chest: Effort normal. chronic chest wall soreness (Prior crush injury)   Abdominal: + distension.  No  "tenderness  Musculoskeletal: Normal range of motion. no  edema.   Neurological: Alert and oriented to person, place, and time.   Skin: Skin is warm and dry.   Psychiatric: Normal mood and affect. Behavior is normal.                  Vital Signs  Temp: 98.8 °F (37.1 °C) (07/30/25 1128)  Pulse: 75 (07/30/25 1128)  Resp: 16 (07/30/25 1128)  BP: 134/78 (07/30/25 1128)  SpO2: 98 % (07/30/25 1128)     24 Hour VS Range    Temp:  [97.7 °F (36.5 °C)-98.9 °F (37.2 °C)]   Pulse:  [64-91]   Resp:  [16-23]   BP: ()/(60-78)   SpO2:  [96 %-100 %]     Intake/Output Summary (Last 24 hours) at 7/30/2025 1138  Last data filed at 7/30/2025 0940  Gross per 24 hour   Intake 1995.59 ml   Output 150 ml   Net 1845.59 ml         I/O This Shift:  I/O this shift:  In: 481.3 [P.O.:480; IV Piggyback:1.3]  Out: -     Wt Readings from Last 3 Encounters:   07/30/25 67.4 kg (148 lb 7.7 oz)   07/25/25 63.5 kg (139 lb 15.9 oz)   08/22/23 67.8 kg (149 lb 6.4 oz)       I have personally reviewed the vitals and recorded Intake/Output     Laboratory/Diagnostic Data:    CBC/Anemia Labs: Coags:    Recent Labs   Lab 07/28/25  0113 07/29/25  0557 07/30/25  0625   WBC 13.61* 10.24 12.42   HGB 11.0* 9.9* 9.5*   HCT 34.3* 31.4* 30.0*   * 511* 489*   MCV 98 98 99*   RDW 16.3* 16.1* 16.3*   IRON 79  --   --    FERRITIN 1,259.0*  --   --    FOLATE  --  7.5  --    [id number]  --  1,414*  --     Recent Labs   Lab 07/28/25  0113 07/29/25  0557 07/30/25  0625   INR 1.2 1.1 1.1   APTT 25.8 26.1 25.2        Chemistries: ABG:   Recent Labs   Lab 07/28/25  0113 07/29/25  0557 07/30/25  0625    139 140   K 3.2* 3.7 3.7    108 109   CO2 24 23 24   BUN 15 13 13   CREATININE 0.6 0.5 0.6   CALCIUM 8.7 8.2* 7.9*   PROT 6.9 5.9* 5.9*   BILITOT 9.0* 6.6* 5.8*   ALKPHOS 671* 712* 653*   * 112* 106*   * 233* 195*   MG 1.9 1.9 1.9   PHOS 3.5 3.0 3.2    No results for input(s): "PH", "PCO2", "PO2", "HCO3", "POCSATURATED", "BE" in the last 168 " "hours.     POCT Glucose: HbA1c:    Recent Labs   Lab 07/28/25  0114 07/28/25  0752 07/28/25  1244 07/28/25  2116 07/29/25  0603 07/29/25  1029   POCTGLUCOSE 113* 95 109 131* 92 92    Hemoglobin A1C   Date Value Ref Range Status   10/19/2021 5.2 3.9 - 6.1 % Final     Comment:     ADA Screening Guidelines:  5.7-6.4%  Consistent with prediabetes  >or=6.5%  Consistent with diabetes    The Hemoglobin A1c assay has significant interference with   Fetal hemoglobin(HbF), producing a negative bias with this   assay. This assay should not be used to diagnose diabetes   during pregnancy, sickle cell trait, anemias, chronic   hepatitis or renal disease. These samples must be   assayed by an alternate method.          Cardiac Enzymes: Ejection Fractions:    No results for input(s): "CPK", "CPKMB", "MB", "TROPONINI" in the last 72 hours. EF   Date Value Ref Range Status   10/01/2021 60 % Final          No results for input(s): "COLORU", "APPEARANCEUA", "PHUR", "SPECGRAV", "PROTEINUA", "GLUCUA", "KETONESU", "BILIRUBINUA", "OCCULTUA", "NITRITE", "UROBILINOGEN", "LEUKOCYTESUR", "RBCUA", "WBCUA", "BACTERIA", "SQUAMEPITHEL", "HYALINECASTS" in the last 48 hours.    Invalid input(s): "WRIGHTSUR"      Procalcitonin (ng/mL)   Date Value   07/28/2025 0.47 (H)     Lactic Acid Level (mmol/L)   Date Value   07/28/2025 1.5     Lactic Acid (mmol/L)   Date Value   07/25/2025 1.4     No results found for: "BNP"  No results found for: "CRP", "SEDRATE"  D-Dimer (ng/mL)   Date Value   09/30/2021 269     Ferritin (ng/mL)   Date Value   07/28/2025 1,259.0 (H)     No results found for: "LDH"  Troponin I (ng/mL)   Date Value   10/01/2021 <0.020   10/01/2021 <0.020   09/30/2021 <0.020   09/30/2021 <0.020     No results found for this or any previous visit.  POC Rapid COVID (no units)   Date Value   09/30/2021 Negative       Microbiology labs for the last week  Microbiology Results (last 7 days)       Procedure Component Value Units Date/Time    Culture, " Anaerobic [[id number]] Collected: 07/28/25 0930    Order Status: Completed Specimen: Ascites Updated: 07/30/25 0923     Anaerobe Culture Culture In Progress    Blood culture [[id number]]  (Normal) Collected: 07/28/25 0113    Order Status: Completed Specimen: Blood from Peripheral, Antecubital, Right Updated: 07/30/25 0700     Blood Culture No Growth After 48 Hours    Blood culture [[id number]]  (Normal) Collected: 07/28/25 0128    Order Status: Completed Specimen: Blood from Peripheral, Forearm, Right Updated: 07/30/25 0700     Blood Culture No Growth After 48 Hours    Aerobic culture [[id number]] Collected: 07/28/25 0930    Order Status: Completed Specimen: Ascites Updated: 07/29/25 0742     CULTURE, AEROBIC No Growth To Date    Gram stain [[id number]] Collected: 07/28/25 0930    Order Status: Completed Specimen: Ascites Updated: 07/28/25 1726     GRAM STAIN Rare WBC seen      No organisms seen    Blood culture [[id number]] Collected: 07/28/25 0128    Order Status: Sent Specimen: Blood from Peripheral, Antecubital, Right             Reviewed and noted in plan where applicable- Please see chart for full lab data.    Lines/Drains:  Peripheral IV 07/28/25 0143 22 G Anterior;Proximal;Right Forearm (Active)   Site Assessment Clean;Intact;Dry 07/28/25 0400   Line Status Infusing 07/28/25 0400   Number of days: 0       Imaging      Results for orders placed during the hospital encounter of 09/30/21    Echo    Interpretation Summary  · The left ventricle is normal in size with normal systolic function.  · The estimated ejection fraction is 60%. Normal wall motion  · Normal left ventricular diastolic function.  · Normal right ventricular size with normal right ventricular systolic function.  · Normal central venous pressure (3 mmHg).  · The estimated PA systolic pressure is 17 mmHg.      US Endoscopic Ultrasound  See OP Notes for results.     IMPRESSION: See OP Notes for results.     This procedure was auto-finalized by:  Virtual Radiologist  FL ERCP Biliary And Pancreatic  See OP Notes for results.     IMPRESSION: See OP Notes for results.     This procedure was auto-finalized by: Virtual Radiologist  VAS US Arterial Legs Bilateral  Indication  ========    Cold Feet and Digits    Pressure Lower  ============    Rt brachial A syst BP  139 mmHg  Rt low thigh BP    255 mmHg  Rt calf  mmHg  Rt PTA BP  191 mmHg  Rt dors pedis A  mmHg  Rt toe BP  0 mmHg  Right JOSE ratio use:   post. tibial  Rt JOSE post tibial (rt post tib A BP / max brach A BP) 1.37  Rt JOSE (rt dors ped A BP / max brach A BP) 1.28  Rt ankle BP / max brach A BP   1.37  Rt TBI (rt toe BP / max brach A BP)    0.00  Lt low thigh BP    255 mmHg  Lt calf  mmHg  Lt PTA BP  175 mmHg  Lt dors pedis A  mmHg  Lt toe BP  89 mmHg  Left JOSE ratio use:    post. tibial  Lt JOSE (lt post tibial A BP / max brach A BP)  1.26  Lt JOSE dors ped (lt dors ped A BP / max brach A BP)    0.83  Lt ankle BP / max brach A BP   1.26  Lt TBI (lt toe BP / max brach A BP)    0.64  PAD severity based on JOSE on right:    no evidence of significant PAOD  PAD severity based on JOSE left:    no evidence of significant PAOD    PPG Lower  =========    Rt toe BP - PPG    0 mmHg  Rt toe digit waveform: absent  Lt toe BP - PPG    89 mmHg  Lt toe digit waveform: moderately dampened    Comment  ========    Ankle-Brachial Indices that exceed 1.3 are considered evidence of dense, calcified plaque. The presence of artifactually elevated  pressures in arteries of the lower extremity can suggest medial calcinosis; therefore, toe pressures and toe brachial indices are  obtained.    Impression  =========    Right Leg: Segmental pressures suggest medial calcinosis. PVR waveforms suggest no evidence of significant PAOD.  Rt lower digits: Toe PPG waveforms as described above. - TBI of 0 with a Great toe pressure of 0 mmHg is noted.    Left Leg: Segmental pressures suggest medial calcinosis. PVR waveforms  suggest no evidence of significant PAOD.  Lt lower digits: Toe PPG waveforms as described above. - TBI of 0.64 with a Great toe pressure of 89 mmHg is noted.    DATE OF SERVICE: 07/29/2025                                                      Sonographer: Shirley Guzman  Electronically Signed by: RODERICK Diane M.D. at 07/29/2025-10:30      Labs, Imaging, EKG and Diagnostic results from 7/30/2025 were reviewed.    Medications:  Medication list was reviewed and changes noted under Assessment/Plan.  Medications Ordered Prior to Encounter[1]  Scheduled Medications:  Current Facility-Administered Medications   Medication Dose Route Frequency    buPROPion  100 mg Oral BID    gabapentin  600 mg Oral BID    lactulose  10 g Oral BID    mupirocin   Nasal BID    piperacillin-tazobactam (Zosyn) IV (PEDS and ADULTS) (extended infusion is not appropriate)  4.5 g Intravenous Q8H     PRN:   Current Facility-Administered Medications:     acetaminophen, 650 mg, Oral, Q4H PRN    dextrose 50%, 12.5 g, Intravenous, PRN    dextrose 50%, 25 g, Intravenous, PRN    glucagon (human recombinant), 1 mg, Intramuscular, PRN    glucose, 16 g, Oral, PRN    glucose, 24 g, Oral, PRN    melatonin, 6 mg, Oral, Nightly PRN    naloxone, 0.4 mg, Intravenous, PRN    polyethylene glycol, 17 g, Oral, Daily PRN    prochlorperazine, 5 mg, Oral, Q6H PRN    senna-docusate, 1 tablet, Oral, BID PRN    sodium chloride 0.9%, 10 mL, Intravenous, Q12H PRN  Infusions:   Estimated Creatinine Clearance: 116.8 mL/min (based on SCr of 0.6 mg/dL).           Assessment & Plan  E coli bacteremia  Likely GI in origin given his cirrhosis/biliary obstruction although evidence of definitive source likely  - Continue zosyn.  - Repeat Bcx here and follow up OSH culture data.  - Consult hepatology and AES. May also need IR vs med consult team for paracentesis to evaluate ascitic fluid.  7/28 E coli bacteremia - Likely GI in origin given his cirrhosis/biliary obstruction.  Continue IV  zosyn.Repeat BC pending. US Liver - cirrhotic liver morphology. and measures 14.1 cm in the right midclavicular line. There is no evidence of hepatic mass. There is hepatopedal flow in the main portal vein. Moderate ascites. common bile duct is mildly dilated to 9 mm in diameter. Echogenic material in the common bile duct. No evidence of gallstones. Gallbladder wall thickening is nonspecific in the setting of ascites. Choledocholithiasis cannot be excluded on the basis of this exam. Consider MRCP.  Hepatology and AES consulted. IR consulted for paracentesis.K replaced     Sepsis 2/2 e coli bacteremia  Patient has sepsis without organ dysfunction secondary to e coli bacteremia, likely GI in origin. A review of systems was completed. Patient's sepsis is improving    Current Antibiotics    piperacillin-tazobactam (ZOSYN) 4.5 g in D5W 100 mL IVPB (MB+), Every 8 hours (non-standard times), Intravenous    Lactate  Recent Labs   Lab 07/25/25  1639 07/28/25  0113   LACTATE 1.4 1.5     Culture Data  Blood Cultures   Blood Culture   Date Value Ref Range Status   07/28/2025 No Growth After 48 Hours  Preliminary   07/28/2025 No Growth After 48 Hours  Preliminary      mSOFA  MSOFA Total  Min: 3   Min taken time: 07/30/25 1101  Max: 5   Max taken time: 07/29/25 1501    - Antibiotics as listed above  - Fluid resuscitation as follows: performed at OSH, no further needed at this time.  - Follow up culture data and evaluate for more definitive source (cholangitis vs SBP).  - Vasopressors were not needed  - The following services were consulted: hepatology, AES.    Biliary obstruction  Elevated LFTs, hyperbili noted initially 6/30 for which pt was admitted to OSH at that time with workup showing mild biliary duct dilation. MRCP performed at that time that did not show choledocholithiasis or significant ductal dilation and pt's LFTs remained elevated but bili downtrended. Pt discharged with GI follow up. However pt with persistent abd  pain and jaundice as well as fever so represented with reoccurrence of worsening cholestatic pattern of labs. CBD dilation noted again on U/S at OSH 7/25 w/o evidence of choledocholithiasis. Found to have e coli bacteremia from uncertain source although likely GI source (cholangitis vs SBP vs other).    - NPO for now for possible procedure.   - Consult AES.  - Continue zosyn and follow up OSH culture data.  - Daily labs to include CMP with fractionated bili, coags, GGT.    7/28 E coli bacteremia - Likely GI in origin given his cirrhosis/biliary obstruction.  Continue IV zosyn.Repeat BC pending. US Liver - cirrhotic liver morphology. and measures 14.1 cm in the right midclavicular line. There is no evidence of hepatic mass. There is hepatopedal flow in the main portal vein. Moderate ascites. common bile duct is mildly dilated to 9 mm in diameter. Echogenic material in the common bile duct. No evidence of gallstones. Gallbladder wall thickening is nonspecific in the setting of ascites. Choledocholithiasis cannot be excluded on the basis of this exam. Consider MRCP.  Hepatology and AES consulted. IR consulted for paracentesis.K replaced     Transaminitis  Likely multifactorial in setting of decompensated cirrhosis and biliary obstruction with infection. Further discussion and plan as in related problems.  Recent Labs     07/28/25  0113 07/29/25  0557 07/30/25  0625   BILITOT 9.0* 6.6* 5.8*   * 233* 195*   * 112* 106*   ALKPHOS 671* 712* 653*    monitor     Hyperbilirubinemia  Likely multifactorial in setting of decompensated cirrhosis and biliary obstruction with infection. Further discussion and plan as in related problems.   Patients liver enzymes  elevated.   Recent Labs     07/28/25  0113 07/29/25  0557 07/30/25  0625   BILITOT 9.0* 6.6* 5.8*   * 233* 195*   * 112* 106*   ALKPHOS 671* 712* 653*   monitor     Decompensated cirrhosis  Co-morbidities are present and inclusive of ascites,  portal hypertension, malnutrition, anemia/pancytopenia, and anticoagulation.  MELD-Na score calculated; MELD 3.0: 18 at 7/30/2025  6:25 AM  MELD-Na: 14 at 7/30/2025  6:25 AM  Calculated from:  Serum Creatinine: 0.6 mg/dL (Using min of 1 mg/dL) at 7/30/2025  6:25 AM  Serum Sodium: 140 mmol/L (Using max of 137 mmol/L) at 7/30/2025  6:25 AM  Total Bilirubin: 5.8 mg/dL at 7/30/2025  6:25 AM  Serum Albumin: 1.8 g/dL at 7/30/2025  6:25 AM  INR(ratio): 1.1 at 7/30/2025  6:25 AM  Age at listing (hypothetical): 65 years  Sex: Male at 7/30/2025  6:25 AM      Continue chronic meds. Etiology thought to be likely ETOH although with also known hx of UC. NAIF checked at OSH which was negative. Will avoid any hepatotoxic meds, and monitor CBC/CMP/INR for synthetic function. Consult hepatology and AES.  Ulcerative colitis  Followed by GI outpatient, on mesalamine.    Plan  - Hold mesalamine.    HTN (hypertension)  Patient's blood pressure range in the last 24 hours was: BP  Min: 95/60  Max: 135/77.The patient's inpatient anti-hypertensive regimen is listed below:  Current Antihypertensives       Plan  - Hold home regimen for now. Monitor and adjust PRN.    HLD (hyperlipidemia)    - Hold statin in setting of elevated LFTs.    Alcohol use  Pt reports he has been trying to quit over the last 2 yrs and had intermittent sobriety. He states he last drank in 04/2025. He denies any history of alcohol withdrawal.    Plan  - CIWA q4 for now, likely able to discontinue pending stability. Likely low risk for withdrawal at this point.  - PETH ordered.  - Continue to  on and encourage cessation.    Normocytic anemia  Likely in setting of known liver dz and probable nutritional deficiencies. Most recent hemoglobin and hematocrit are listed below.  Recent Labs     07/28/25  0113 07/29/25  0557 07/30/25  0625   HGB 11.0* 9.9* 9.5*   HCT 34.3* 31.4* 30.0*     Plan  - Monitor serial CBC: Daily  - Order iron studies and vitamin levels for  evaluation.   - Transfuse PRBC if patient becomes hemodynamically unstable, symptomatic or H/H drops below 7/21.  - Patient has not received any PRBC transfusions to date  - Patient's anemia is currently stable  Thrombocytosis  Likely reactive in setting of infection/inflammation with ongoing GI issues. Continue to monitor.    Recent Labs   Lab 07/28/25  0113 07/29/25  0557 07/30/25  0625   * 511* 489*   monitor   Depression    - Continue home wellbutrin.      Hypokalemia  Patient's most recent potassium results are listed below.   Recent Labs     07/28/25  0113 07/29/25  0557 07/30/25  0625   K 3.2* 3.7 3.7     - Replete potassium per protocol  - Monitor potassium Daily  Leucocytosis  Leucocytosis  Patient with leucocytosis   Recent Labs   Lab 07/28/25  0113 07/29/25  0557 07/30/25  0625   WBC 13.61* 10.24 12.42     . Afebrile. BCX 2 pending . likely secondary to sepsis.  PAD (peripheral artery disease)  7/30 TBI of 0 with a Great toe pressure of 0 mmHg is noted RLE but asymptomatic.vascular surgery consulted       VTE Risk Mitigation (From admission, onward)           Ordered     IP VTE HIGH RISK PATIENT  Once         07/28/25 0328     Place sequential compression device  Until discontinued         07/28/25 0328     Reason for No Pharmacological VTE Prophylaxis  Once        Comments: Hold for possible procedure.   Question:  Reasons:  Answer:  Physician Provided (leave comment)    07/28/25 0328                    Discharge Planning   NELLY: 8/2/2025     Code Status: Full Code   Medical Readiness for Discharge Date:   Discharge Plan A: Home with family                        Lake Jones MD  Department of Hospital Medicine   [facility] - Transplant Stepdown         [1]   No current facility-administered medications on file prior to encounter.     Current Outpatient Medications on File Prior to Encounter   Medication Sig Dispense Refill    atorvastatin (LIPITOR) 40 MG tablet Take 1 tablet (40 mg total) by  mouth once daily. 90 tablet 3    buPROPion (WELLBUTRIN SR) 100 MG TBSR 12 hr tablet Take 100 mg by mouth 2 (two) times daily.      cholecalciferol, vitamin D3, (VITAMIN D3) 50 mcg (2,000 unit) Cap capsule Take 1 capsule (2,000 Units total) by mouth once daily. 90 capsule 3    gabapentin (NEURONTIN) 600 MG tablet Take 0.5 tablets (300 mg total) by mouth every evening. (Patient taking differently: Take 300 mg by mouth 3 (three) times daily.) 15 tablet 11    isosorbide mononitrate (IMDUR) 30 MG 24 hr tablet Take 1 tablet (30 mg total) by mouth once daily. 90 tablet 3    lisinopriL (PRINIVIL,ZESTRIL) 5 MG tablet Take 1 tablet (5 mg total) by mouth once daily. 90 tablet 3    mesalamine (DELZICOL) 400 mg cdti cdti capsule Take 2 capsules (800 mg total) by mouth 3 (three) times daily. 180 capsule 11    nicotine (NICODERM CQ) 7 mg/24 hr Place 1 patch onto the skin once daily. 30 patch 3    tiZANidine (ZANAFLEX) 4 MG tablet Take 4 mg by mouth every 8 (eight) hours as needed.

## 2025-07-30 NOTE — PLAN OF CARE
K+=3.7. Cr=0.6. Mg+2=1.9. Phos=3.2. Liver tests dec today from yesterday. Tolerating po well. Voiding without difficulty. Last BM today. Ambulating in room independently wearing non-skid socks. Afebrile. Zosyn discontinued. Levofloxacin po started. Plan to discharge today. Needed home meds delivered to room.

## 2025-07-30 NOTE — ASSESSMENT & PLAN NOTE
7/30 TBI of 0 with a Great toe pressure of 0 mmHg is noted RLE but asymptomatic.vascular surgery consulted

## 2025-07-30 NOTE — SUBJECTIVE & OBJECTIVE
Subjective:   HPI:  Eric Ma is a 65 year old male for whom AES is consulted for elevated liver enzymes and concern for biliary obstruction. He has a history of alcohol use disorder (last drink 4/2025), decompensated alcohol related cirrhosis associated with ascites, Ulcerative Colitis (currently on Mesalamine/delzicol). History provided by patient and chart review.      He was recently admitted to OSH at the end of June for elevated liver enzymes. Labs notable for T bili 7.4, AST//130, . MRCP without evidence of choledocholithiasis. His liver enzymes improved and he was discharged home. He presented to [facility] on 7/25 for generalized abdominal pain. US RUQ showed mild dilation of the CBD to 9 mm wih echogenic material in the CBD. Of note blood cx positive for E.coli. He was started on Zosyn and [place] [facility] [place] Most recent labs notable for WBC 13.61, Hgb 11, INR 1.2, T bili 9.0 (direct 6.1), , AST//104, CRP 75, procal 0.47, otherwise normal LA, serum alcohol and UA. On my assessment, he does report generalized abdominal pain.     ERCP 7/29- a single localized biliary stricture was found in the lower third of the main bile duct. The stricture was indeterminate. Cells for cytology obtained in the lower third of the main duct     Interval History: Patient is feeling well post procedure. Denies abdominal pain, NV, fever, chills. Tolerating diet. LFTs with Tbili 5.8 (from 6.6),  (from 712),  (from 233),  (from 112). WBC wnl. H/H stable. Afebrile     Review of Systems   Constitutional:  Negative for chills and fever.   Gastrointestinal:  Negative for abdominal distention, abdominal pain, anal bleeding, blood in stool, constipation, diarrhea, nausea, rectal pain and vomiting.   Psychiatric/Behavioral:  Negative for confusion.      Objective:     Vital Signs (Most Recent):  Temp: 98.8 °F (37.1 °C) (07/30/25  "1128)  Pulse: 73 (07/30/25 1141)  Resp: 16 (07/30/25 1128)  BP: 134/78 (07/30/25 1128)  SpO2: 98 % (07/30/25 1128) Vital Signs (24h Range):  Temp:  [97.7 °F (36.5 °C)-98.9 °F (37.2 °C)] 98.8 °F (37.1 °C)  Pulse:  [64-91] 73  Resp:  [16-23] 16  SpO2:  [96 %-100 %] 98 %  BP: ()/(60-78) 134/78     Weight: 67.4 kg (148 lb 7.7 oz) (07/30/25 0610)  Body mass index is 22.91 kg/m².      Intake/Output Summary (Last 24 hours) at 7/30/2025 1232  Last data filed at 7/30/2025 0940  Gross per 24 hour   Intake 1995.59 ml   Output 150 ml   Net 1845.59 ml       Lines/Drains/Airways       Peripheral Intravenous Line  Duration             Peripheral IV 07/28/25 0143 22 G Anterior;Proximal;Right Forearm 2 days                     Physical Exam  Constitutional:       General: He is not in acute distress.     Appearance: Normal appearance. He is not ill-appearing.   Eyes:      General: Scleral icterus present.   Abdominal:      General: Abdomen is flat. Bowel sounds are normal. There is no distension.      Palpations: Abdomen is soft. There is no mass.      Tenderness: There is no abdominal tenderness. There is no guarding or rebound.      Hernia: No hernia is present.   Skin:     General: Skin is warm and dry.      Coloration: Skin is jaundiced. Skin is not pale.   Neurological:      Mental Status: He is alert and oriented to person, place, and time. Mental status is at baseline.          Significant Labs:  CBC:   Recent Labs   Lab 07/29/25  0557 07/30/25  0625   WBC 10.24 12.42   HGB 9.9* 9.5*   HCT 31.4* 30.0*   * 489*     CMP:   Recent Labs   Lab 07/30/25  0625   GLU 91   CALCIUM 7.9*   ALBUMIN 1.8*   PROT 5.9*      K 3.7   CO2 24      BUN 13   CREATININE 0.6   ALKPHOS 653*   *   *   BILITOT 5.8*     Lipase: No results for input(s): "LIPASE" in the last 48 hours.  Liver Function Test:   Recent Labs   Lab 07/29/25  0557 07/30/25  0625   * 106*   * 195*   ALKPHOS 712* 653*   BILITOT " 6.6* 5.8*   PROT 5.9* 5.9*   ALBUMIN 1.7* 1.8*         Significant Imaging:  Imaging results within the past 24 hours have been reviewed.

## 2025-07-30 NOTE — PROGRESS NOTES
[facility]  Gastroenterology  Progress Note    Patient Name: Eric Ma  MRN: [id number]  Admission Date: 7/28/2025  Hospital Length of Stay: 2 days  Code Status: Full Code   Attending Provider: Lake Jones MD  Consulting Provider: Pepe Oscar PA-C  Primary Care Physician: Adelita Hernandez NP  Principal Problem: E coli bacteremia        Subjective:   HPI:  Eric Ma is a 65 year old male for whom AES is consulted for elevated liver enzymes and concern for biliary obstruction. He has a history of alcohol use disorder (last drink 4/2025), decompensated alcohol related cirrhosis associated with ascites, Ulcerative Colitis (currently on Mesalamine/delzicol). History provided by patient and chart review.      He was recently admitted to OSH at the end of June for elevated liver enzymes. Labs notable for T bili 7.4, AST//130, . MRCP without evidence of choledocholithiasis. His liver enzymes improved and he was discharged home. He presented to [facility] on 7/25 for generalized abdominal pain. US RUQ showed mild dilation of the CBD to 9 mm wih echogenic material in the CBD. Of note blood cx positive for E.coli. He was started on Zosyn and [place] [facility] [place] Most recent labs notable for WBC 13.61, Hgb 11, INR 1.2, T bili 9.0 (direct 6.1), , AST//104, CRP 75, procal 0.47, otherwise normal LA, serum alcohol and UA. On my assessment, he does report generalized abdominal pain.     ERCP 7/29- a single localized biliary stricture was found in the lower third of the main bile duct. The stricture was indeterminate. Cells for cytology obtained in the lower third of the main duct     Interval History: Patient is feeling well post procedure. Denies abdominal pain, NV, fever, chills. Tolerating diet. LFTs with Tbili 5.8 (from 6.6),  (from 712),  (from 233),  (from 112). WBC wnl. H/H stable. Afebrile      Review of Systems   Constitutional:  Negative for chills and fever.   Gastrointestinal:  Negative for abdominal distention, abdominal pain, anal bleeding, blood in stool, constipation, diarrhea, nausea, rectal pain and vomiting.   Psychiatric/Behavioral:  Negative for confusion.      Objective:     Vital Signs (Most Recent):  Temp: 98.8 °F (37.1 °C) (07/30/25 1128)  Pulse: 73 (07/30/25 1141)  Resp: 16 (07/30/25 1128)  BP: 134/78 (07/30/25 1128)  SpO2: 98 % (07/30/25 1128) Vital Signs (24h Range):  Temp:  [97.7 °F (36.5 °C)-98.9 °F (37.2 °C)] 98.8 °F (37.1 °C)  Pulse:  [64-91] 73  Resp:  [16-23] 16  SpO2:  [96 %-100 %] 98 %  BP: ()/(60-78) 134/78     Weight: 67.4 kg (148 lb 7.7 oz) (07/30/25 0610)  Body mass index is 22.91 kg/m².      Intake/Output Summary (Last 24 hours) at 7/30/2025 1232  Last data filed at 7/30/2025 0940  Gross per 24 hour   Intake 1995.59 ml   Output 150 ml   Net 1845.59 ml       Lines/Drains/Airways       Peripheral Intravenous Line  Duration             Peripheral IV 07/28/25 0143 22 G Anterior;Proximal;Right Forearm 2 days                     Physical Exam  Constitutional:       General: He is not in acute distress.     Appearance: Normal appearance. He is not ill-appearing.   Eyes:      General: Scleral icterus present.   Abdominal:      General: Abdomen is flat. Bowel sounds are normal. There is no distension.      Palpations: Abdomen is soft. There is no mass.      Tenderness: There is no abdominal tenderness. There is no guarding or rebound.      Hernia: No hernia is present.   Skin:     General: Skin is warm and dry.      Coloration: Skin is jaundiced. Skin is not pale.   Neurological:      Mental Status: He is alert and oriented to person, place, and time. Mental status is at baseline.          Significant Labs:  CBC:   Recent Labs   Lab 07/29/25  0557 07/30/25  0625   WBC 10.24 12.42   HGB 9.9* 9.5*   HCT 31.4* 30.0*   * 489*     CMP:   Recent Labs   Lab 07/30/25  0630  "  GLU 91   CALCIUM 7.9*   ALBUMIN 1.8*   PROT 5.9*      K 3.7   CO2 24      BUN 13   CREATININE 0.6   ALKPHOS 653*   *   *   BILITOT 5.8*     Lipase: No results for input(s): "LIPASE" in the last 48 hours.  Liver Function Test:   Recent Labs   Lab 07/29/25  0557 07/30/25  0625   * 106*   * 195*   ALKPHOS 712* 653*   BILITOT 6.6* 5.8*   PROT 5.9* 5.9*   ALBUMIN 1.7* 1.8*         Significant Imaging:  Imaging results within the past 24 hours have been reviewed.  Assessment/Plan:     GI  Biliary obstruction  ERCP 7/29- a single localized biliary stricture was found in the lower third of the main bile duct. The stricture was indeterminate. Cells for cytology obtained in the lower third of the main duct    - Patient is feeling well post procedure. Denies abdominal pain, NV, fever, chills. Tolerating diet.   -LFTs with Tbili 5.8 (from 6.6),  (from 712),  (from 233),  (from 112). WBC wnl. H/H stable. Afebrile   -Repeat ERCP in 6 weeks for stent exchange/removal  -Await cytology results  -Rest of patient's care per primary team  -AES will sign off at this time; please reach out with any further questions/concerns         Thank you for your consult. I will sign off. Please contact us if you have any additional questions.    Pepe Oscar PA-C  Gastroenterology  [facility]  "

## 2025-07-30 NOTE — ASSESSMENT & PLAN NOTE
Elevated LFTs, hyperbili noted initially 6/30 for which pt was admitted to OSH at that time with workup showing mild biliary duct dilation. MRCP performed at that time that did not show choledocholithiasis or significant ductal dilation and pt's LFTs remained elevated but bili downtrended. Pt discharged with GI follow up. However pt with persistent abd pain and jaundice as well as fever so represented with reoccurrence of worsening cholestatic pattern of labs. CBD dilation noted again on U/S at OSH 7/25 w/o evidence of choledocholithiasis. Found to have e coli bacteremia from uncertain source although likely GI source (cholangitis vs SBP vs other).    - NPO for now for possible procedure.   - Consult AES.  - Continue zosyn and follow up OSH culture data.  - Daily labs to include CMP with fractionated bili, coags, GGT.    7/28 E coli bacteremia - Likely GI in origin given his cirrhosis/biliary obstruction.  Continue IV zosyn.Repeat BC pending. US Liver - cirrhotic liver morphology. and measures 14.1 cm in the right midclavicular line. There is no evidence of hepatic mass. There is hepatopedal flow in the main portal vein. Moderate ascites. common bile duct is mildly dilated to 9 mm in diameter. Echogenic material in the common bile duct. No evidence of gallstones. Gallbladder wall thickening is nonspecific in the setting of ascites. Choledocholithiasis cannot be excluded on the basis of this exam. Consider MRCP.  Hepatology and AES consulted. IR consulted for paracentesis.K replaced   7/30 ID eval - transition levoquin for a 7 day course ( end date 8/8/25). Discharge home today

## 2025-07-30 NOTE — ASSESSMENT & PLAN NOTE
Patient has sepsis without organ dysfunction secondary to e coli bacteremia, likely GI in origin. A review of systems was completed. Patient's sepsis is improving    Current Antibiotics    levoFLOXacin tablet 750 mg, Daily, Oral  levoFLOXacin (LEVAQUIN) tablet, Daily, Oral    Lactate  Recent Labs   Lab 07/25/25  1639 07/28/25  0113   LACTATE 1.4 1.5     Culture Data  Blood Cultures   Blood Culture   Date Value Ref Range Status   07/28/2025 No Growth After 48 Hours  Preliminary   07/28/2025 No Growth After 48 Hours  Preliminary      mSOFA  MSOFA Total  Min: 3   Min taken time: 07/30/25 1201  Max: 5   Max taken time: 07/29/25 1501    - Antibiotics as listed above  - Fluid resuscitation as follows: performed at OSH, no further needed at this time.  - Follow up culture data and evaluate for more definitive source (cholangitis vs SBP).  - Vasopressors were not needed  - The following services were consulted: hepatology, AES.

## 2025-07-30 NOTE — ASSESSMENT & PLAN NOTE
Likely multifactorial in setting of decompensated cirrhosis and biliary obstruction with infection. Further discussion and plan as in related problems.   Patients liver enzymes  elevated.   Recent Labs     07/28/25  0113 07/29/25  0557 07/30/25  0625   BILITOT 9.0* 6.6* 5.8*   * 233* 195*   * 112* 106*   ALKPHOS 671* 712* 653*   monitor

## 2025-07-30 NOTE — ASSESSMENT & PLAN NOTE
Leucocytosis  Patient with leucocytosis   Recent Labs   Lab 07/28/25  0113 07/29/25  0557 07/30/25  0625   WBC 13.61* 10.24 12.42     . Afebrile. BCX 2 pending . likely secondary to sepsis.

## 2025-07-31 ENCOUNTER — NURSE TRIAGE (OUTPATIENT)
Dept: ADMINISTRATIVE | Facility: CLINIC | Age: 65
End: 2025-07-31
Payer: MEDICARE

## 2025-07-31 ENCOUNTER — TELEPHONE (OUTPATIENT)
Dept: VASCULAR SURGERY | Facility: CLINIC | Age: 65
End: 2025-07-31
Payer: MEDICARE

## 2025-07-31 ENCOUNTER — TELEPHONE (OUTPATIENT)
Dept: HEPATOLOGY | Facility: CLINIC | Age: 65
End: 2025-07-31
Payer: MEDICARE

## 2025-07-31 LAB — BACTERIA SPEC AEROBE CULT: NO GROWTH

## 2025-07-31 NOTE — TELEPHONE ENCOUNTER
Spoke to pt's daughter, Marielos. Pt scheduled for appt w/ Dr. Salazar on 8/20/25. Appt letter mailed. Marielos verbalized understanding and had no further questions.

## 2025-07-31 NOTE — TELEPHONE ENCOUNTER
CRM # [id number]  Owner: None  Status: Unresolved  Open  Priority: Routine Created on: 07/31/2025 04:11 PM By: Tanya Estrada     Primary Information    Source   Eric Ma (Patient)    Subject   Eric Ma (Patient)    Topic   Appointments - Appointment Access        Summary   Scheduling Request   Communication   Scheduling Request                           Appt Type:  Np             Date/Time Preference: Next available             Treating Provider: any             Caller Name: patient daughter             Contact Preference: [phone]                  Comment: referral for  Biliary obstruction, please call to advise thank you

## 2025-07-31 NOTE — TELEPHONE ENCOUNTER
"Pt just had Surgery on 7/29 ( ERCP, with Dr. Janusz Stern) Post discharge Tracker return call, and he states that he has a lot of fluid on his body. Pt states that he was sent home without any fluid pills. Pt was taking lasix in the hospital. Pt states that he feels like the swelling/fluid has gotten worse since he has gotten home from the hospital, especially in his "belly". Pt wondering if he should be taking some type of fluid pill at home?  Dispo- discuss with PCP and callback by nurse within 1 hour.   Reached out to Dr. Oscar Pfeiffer, OCP, and Dr. [facility] A Grigsby and Dr. Grigsby advised, Not sure as i am not his primary care team and not apart of this ERCP when he was here. I added Pepe with AES but not sure if this fluid retention is related. Pt aware and advised pt that I would try and get in touch with another Doctor and call pt back. Pt VU.   Reached out to Dr. Siva Vela , advised He likely needs diuretics but would need close monitoring after starting. He said that he preferred to follow with his local gastroenterolgist instead of us since he lives so far away   Pt advised that he needs to F/U with his PCP or Doctors close to home. Pt VU. Advised pt to call back with any further questions, concerns or worsening of symptoms.     Reason for Disposition   Condition / symptoms WORSE    Additional Information   Negative: Sounds like a life-threatening emergency to the triager   Negative: Patient sounds very sick or weak to the triager   Negative: Sounds like a serious complication to the triager    Protocols used: Post-Hospitalization Follow-up Call-A-[facility]    "

## 2025-07-31 NOTE — TELEPHONE ENCOUNTER
----- Message from Chai Salazar MD sent at 7/31/2025  2:55 PM CDT -----  No other imaging.  Thank you.  ----- Message -----  From: Sophie Lassiter RN  Sent: 7/31/2025  11:44 AM CDT  To: Chai Salazar MD    Hey, pt being referred for PAD. He had a bilateral leg arterial US on 7/29/25. Can you review it and let me know if you want any other imaging? Thanks!

## 2025-07-31 NOTE — TELEPHONE ENCOUNTER
Called the patient to schedule a hepatology consult from referral.  Spoke with Ms. Arceo pt's daughter. Scheduled to the next available appt 10/8/2025 with Dr. Rockwell. Ms. Arceo confirmed and agreed with the schedule.  Reminder letter mailed.

## 2025-07-31 NOTE — PLAN OF CARE
[facility]  Discharge Final Note    Primary Care Provider: Adelita Hernandez NP    Expected Discharge Date: 7/30/2025    Patient discharged to home via daughter's transportation.     Patient's bedside nurse provided discharge instructions.    Discharge Plan A and Plan B have been determined by review of patient's clinical status, future medical and therapeutic needs, and coverage/benefits for post-acute care in coordination with multidisciplinary team members.        Final Discharge Note (most recent)       Final Note - 07/28/25 1156          Final Note    Assessment Type Discharge Planning Assessment                     Important Message from Medicare                 No future appointments.     scheduled post-discharge follow-up appointment and information added to AVS.      Tadeo Thakur, RN, BSN  Case Management  [phone]

## 2025-08-01 ENCOUNTER — TELEPHONE (OUTPATIENT)
Dept: TRANSPLANT | Facility: CLINIC | Age: 65
End: 2025-08-01
Payer: MEDICARE

## 2025-08-01 ENCOUNTER — TELEPHONE (OUTPATIENT)
Dept: GASTROENTEROLOGY | Facility: CLINIC | Age: 65
End: 2025-08-01
Payer: MEDICARE

## 2025-08-01 LAB
ESTROGEN SERPL-MCNC: ABNORMAL PG/ML
INSULIN SERPL-ACNC: ABNORMAL U[IU]/ML
LAB AP CLINICAL INFORMATION: ABNORMAL
LAB AP COMMENTS: ABNORMAL
LAB AP GROSS DESCRIPTION: ABNORMAL
LAB AP NON-GYN INTERPRETATION SPECIMEN 1: ABNORMAL
LAB AP PERFORMING LOCATION(S): ABNORMAL
LAB AP REPORT FOOTNOTES: ABNORMAL
T3RU NFR SERPL: ABNORMAL %

## 2025-08-01 NOTE — TELEPHONE ENCOUNTER
I left a voicemail for patient to call me back to schedule [place] [facility] [place]

## 2025-08-02 LAB
BACTERIA BLD CULT: NORMAL
BACTERIA BLD CULT: NORMAL

## 2025-08-04 ENCOUNTER — PATIENT MESSAGE (OUTPATIENT)
Dept: GASTROENTEROLOGY | Facility: HOSPITAL | Age: 65
End: 2025-08-04
Payer: MEDICARE

## 2025-08-04 ENCOUNTER — TELEPHONE (OUTPATIENT)
Dept: GASTROENTEROLOGY | Facility: CLINIC | Age: 65
End: 2025-08-04
Payer: MEDICARE

## 2025-08-04 LAB — BACTERIA SPEC ANAEROBE CULT: NORMAL

## 2025-08-04 NOTE — TELEPHONE ENCOUNTER
Left voicemail for patient to call me back to schedule procedure. Pt needs ERCP for stent removal/ exchange

## 2025-08-06 ENCOUNTER — LAB VISIT (OUTPATIENT)
Dept: LAB | Facility: HOSPITAL | Age: 65
End: 2025-08-06
Attending: STUDENT IN AN ORGANIZED HEALTH CARE EDUCATION/TRAINING PROGRAM
Payer: MEDICARE

## 2025-08-06 ENCOUNTER — OFFICE VISIT (OUTPATIENT)
Dept: HEPATOLOGY | Facility: CLINIC | Age: 65
End: 2025-08-06
Payer: MEDICARE

## 2025-08-06 VITALS
HEIGHT: 67 IN | OXYGEN SATURATION: 98 % | WEIGHT: 150.81 LBS | SYSTOLIC BLOOD PRESSURE: 149 MMHG | RESPIRATION RATE: 18 BRPM | DIASTOLIC BLOOD PRESSURE: 72 MMHG | BODY MASS INDEX: 23.67 KG/M2 | TEMPERATURE: 98 F | HEART RATE: 87 BPM

## 2025-08-06 DIAGNOSIS — Z11.59 ENCOUNTER FOR SCREENING FOR OTHER VIRAL DISEASES: ICD-10-CM

## 2025-08-06 DIAGNOSIS — K74.60 HEPATIC CIRRHOSIS, UNSPECIFIED HEPATIC CIRRHOSIS TYPE, UNSPECIFIED WHETHER ASCITES PRESENT: ICD-10-CM

## 2025-08-06 DIAGNOSIS — R97.8 OTHER ABNORMAL TUMOR MARKERS: ICD-10-CM

## 2025-08-06 DIAGNOSIS — K83.1 BILIARY OBSTRUCTION: ICD-10-CM

## 2025-08-06 DIAGNOSIS — K74.60 HEPATIC CIRRHOSIS, UNSPECIFIED HEPATIC CIRRHOSIS TYPE, UNSPECIFIED WHETHER ASCITES PRESENT: Primary | ICD-10-CM

## 2025-08-06 LAB
ABSOLUTE EOSINOPHIL (OHS): 0.15 K/UL
ABSOLUTE MONOCYTE (OHS): 0.85 K/UL (ref 0.3–1)
ABSOLUTE NEUTROPHIL COUNT (OHS): 5.03 K/UL (ref 1.8–7.7)
ALBUMIN SERPL BCP-MCNC: 2.6 G/DL (ref 3.5–5.2)
ALP SERPL-CCNC: 470 UNIT/L (ref 40–150)
ALT SERPL W/O P-5'-P-CCNC: 60 UNIT/L (ref 0–55)
ANION GAP (OHS): 13 MMOL/L (ref 8–16)
AST SERPL-CCNC: 88 UNIT/L (ref 0–50)
BASOPHILS # BLD AUTO: 0.09 K/UL
BASOPHILS NFR BLD AUTO: 1.2 %
BILIRUB SERPL-MCNC: 5 MG/DL (ref 0.1–1)
BUN SERPL-MCNC: 13 MG/DL (ref 8–23)
CALCIUM SERPL-MCNC: 8.2 MG/DL (ref 8.7–10.5)
CHLORIDE SERPL-SCNC: 103 MMOL/L (ref 95–110)
CO2 SERPL-SCNC: 26 MMOL/L (ref 23–29)
CREAT SERPL-MCNC: 0.7 MG/DL (ref 0.5–1.4)
ERYTHROCYTE [DISTWIDTH] IN BLOOD BY AUTOMATED COUNT: 16.8 % (ref 11.5–14.5)
GFR SERPLBLD CREATININE-BSD FMLA CKD-EPI: >60 ML/MIN/1.73/M2
GLUCOSE SERPL-MCNC: 74 MG/DL (ref 70–110)
HAV AB SER QL IA: REACTIVE
HBV SURFACE AB SER-ACNC: <3 MIU/ML
HBV SURFACE AB SERPL IA-ACNC: NORMAL M[IU]/ML
HCT VFR BLD AUTO: 33.7 % (ref 40–54)
HGB BLD-MCNC: 10.5 GM/DL (ref 14–18)
IMM GRANULOCYTES # BLD AUTO: 0.03 K/UL (ref 0–0.04)
IMM GRANULOCYTES NFR BLD AUTO: 0.4 % (ref 0–0.5)
INR PPP: 1.1 (ref 0.8–1.2)
LYMPHOCYTES # BLD AUTO: 1.3 K/UL (ref 1–4.8)
MCH RBC QN AUTO: 32.4 PG (ref 27–31)
MCHC RBC AUTO-ENTMCNC: 31.2 G/DL (ref 32–36)
MCV RBC AUTO: 104 FL (ref 82–98)
NUCLEATED RBC (/100WBC) (OHS): 0 /100 WBC
PLATELET # BLD AUTO: 417 K/UL (ref 150–450)
PMV BLD AUTO: 9 FL (ref 9.2–12.9)
POTASSIUM SERPL-SCNC: 3.6 MMOL/L (ref 3.5–5.1)
PROT SERPL-MCNC: 6.7 GM/DL (ref 6–8.4)
PROTHROMBIN TIME: 11.9 SECONDS (ref 9–12.5)
RBC # BLD AUTO: 3.24 M/UL (ref 4.6–6.2)
RELATIVE EOSINOPHIL (OHS): 2 %
RELATIVE LYMPHOCYTE (OHS): 17.4 % (ref 18–48)
RELATIVE MONOCYTE (OHS): 11.4 % (ref 4–15)
RELATIVE NEUTROPHIL (OHS): 67.6 % (ref 38–73)
SODIUM SERPL-SCNC: 142 MMOL/L (ref 136–145)
WBC # BLD AUTO: 7.45 K/UL (ref 3.9–12.7)

## 2025-08-06 PROCEDURE — 85610 PROTHROMBIN TIME: CPT

## 2025-08-06 PROCEDURE — 99213 OFFICE O/P EST LOW 20 MIN: CPT | Mod: PBBFAC,TXP | Performed by: STUDENT IN AN ORGANIZED HEALTH CARE EDUCATION/TRAINING PROGRAM

## 2025-08-06 PROCEDURE — 80053 COMPREHEN METABOLIC PANEL: CPT

## 2025-08-06 PROCEDURE — 99215 OFFICE O/P EST HI 40 MIN: CPT | Mod: S$PBB,GC,, | Performed by: STUDENT IN AN ORGANIZED HEALTH CARE EDUCATION/TRAINING PROGRAM

## 2025-08-06 PROCEDURE — 36415 COLL VENOUS BLD VENIPUNCTURE: CPT | Mod: TXP

## 2025-08-06 PROCEDURE — 86706 HEP B SURFACE ANTIBODY: CPT | Mod: 59

## 2025-08-06 PROCEDURE — 99999 PR PBB SHADOW E&M-EST. PATIENT-LVL III: CPT | Mod: PBBFAC,TXP,, | Performed by: STUDENT IN AN ORGANIZED HEALTH CARE EDUCATION/TRAINING PROGRAM

## 2025-08-06 PROCEDURE — 85025 COMPLETE CBC W/AUTO DIFF WBC: CPT

## 2025-08-06 PROCEDURE — 86790 VIRUS ANTIBODY NOS: CPT

## 2025-08-06 NOTE — PROGRESS NOTES
I have personally performed a face to face diagnostic evaluation on this patient. I have reviewed and agree with today's findings and the care plan outlined by Brandy Rockwell DO      My findings are as follows:  Patient presents with decompensated alcohol related cirrhosis - ascites/edema  Also appears to have a biliary stricture- stented  - high Ca 19 9  - hx of UC    ? PSC in addition to his alcohol use disorder  Patient aware that he may have a biliary malignancy  Stopped drinking in April 2025  - rehab in 2019  - does not think he needs it now.    1) Increased diuretics  2) Repeat ERCP and Ca 19 9    he will return to Brandy Rockwell DO in 6 weeks for follow-up.

## 2025-08-06 NOTE — PROGRESS NOTES
Hepatology Clinic Note    Referring provider: Dr. Lake Jones  PCP: Adelita Hernandez NP    Chief complaint: Cirrhosis     HPI:  Eric Ma is a 65 y.o. male who was referred to Hepatology Clinic for Cirrhosis.     The patient denies licit drug use (prior weed use many years ago), blood transfusions or unprofessional tattoos. Denies history of DM or MATIAS. Has history of HTN and HLD. Denies family history of liver disease or liver cancer.     The patient denies signs/symptoms of decompensated liver disease, including no recent abdominal distension, encephalopathy, jaundice, gross GI bleeding. The patient denies prior evaluation by hepatologist, liver biopsy, paracentesis.    Cirrhosis Diagnosis Date: Was first told of his diagnosis of cirrhosis in 11/2024. Was told that he had cirrhosis from EtOH use. Last drink was 4/4/2025. PETH negative on 7/28. He was drinking beers; he was drinking 15 per day. He was drinking for pleasure and also for anxiety and depression. He is taking Wellbutrin. He was put in rehab ([facility] rehab) in 11/2019 by his kids. Then he was there for 18 months. He has never done AA. He has had a DUI twice (~2018). He was arrested in 10/2019 over a family dispute.   Cirrhosis was noted on contrasted CT in 6/2025 with varices and splenomegaly. Obtained for evaluation of jaundice. Heterogenous and coarsened echotexture to the liver in 2021 on US.     Volume: Was started on Lasix by his PCP recently. Was started on 8/1 at 20 mg QD. Has doubled the dose since swelling in the legs and abdomen had not improved. He is currently taking 40 mg QD. Had 1500 cc paracentesis while admitted to the hospital with biliary obstruction/jaundice. He is on Aldactone 50 mg QD.   Infection: No history of SBP.   Bleeding: He has never had GI bleeding from varices. Recent EGD in 2025 without reported varices. He has rectal bleeding related to UC, but he is on Delzicol and is well controlled on  this regimen. His GI is Dr. Browning in [place].   Encephalopathy: He was given lactulose in the hospital recently, but is not taking this at home. He does not report any issues with confusion and his 3 adult children corroborate this as well.   Screening: AFP wnl on 7/1/25. US without concerning lesions in 7/2025.   Immunizations: Unsure of immunity status. Contracted Hep A many years ago.   Transplant: He is interested in living. He would be interested in transplant.     [place] are from [facility]; [place] west of [facility].     Past Medical History:   Diagnosis Date    Back pain     Hypertension     Ulcerative colitis        Past Surgical History:   Procedure Laterality Date    ANGIOGRAM, CORONARY, WITH LEFT HEART CATHETERIZATION  10/01/2021    Procedure: Angiogram, Coronary, with Left Heart Cath;  Surgeon: Gabriele Renyolds MD;  Location: [facility] CATH LAB;  Service: Cardiology;;    APPENDECTOMY      CARPECTOMY Right 02/09/2023    Procedure: CARPECTOMY;  Surgeon: Rai Carroll MD;  Location: [facility] MAIN OR;  Service: Orthopedics;  Laterality: Right;  [facility]    ENDOSCOPIC ULTRASOUND OF UPPER GASTROINTESTINAL TRACT  7/29/2025    Procedure: ULTRASOUND, UPPER GI TRACT, ENDOSCOPIC;  Surgeon: Janusz Stern MD;  Location: [facility] ([address] [place] [address]);  Service: Endoscopy;;    ERCP N/A 7/29/2025    Procedure: ERCP (ENDOSCOPIC RETROGRADE CHOLANGIOPANCREATOGRAPHY);  Surgeon: Janusz Stern MD;  [facility]  Service: Endoscopy;  Laterality: N/A;    FRACTURE SURGERY      LAMINECTOMY, SPINE, MINIMALLY INVASIVE, POSTERIOR APPROACH N/A 8/22/2023    Procedure: L4-S1 Decompressive Lumbar Laminectomy;  Surgeon: Cynthia Velazquez MD;  Location: [facility] MAIN OR;  Service: Neurosurgery;  Laterality: N/A;       Family History   Problem Relation Name Age of Onset    Breast cancer Mother      Hypertension Mother      Hearing loss Mother      Arthritis Mother      Heart disease Father      Hypertension Father      Hearing loss  "Father      Arthritis Father      Drug abuse Sister      Arthritis Sister      Early death Brother      Drug abuse Brother      Diabetes Brother      Cancer Brother      Arthritis Brother         Social History[1]    Current Medications[2]    Review of patient's allergies indicates:  No Known Allergies    Review of Systems   Constitutional:  Negative for chills and fever.   Gastrointestinal:  Negative for abdominal pain, nausea and vomiting.       Vitals:    08/06/25 1448   BP: (!) 149/72   Pulse: 87   Resp: 18   Temp: 97.9 °F (36.6 °C)   TempSrc: Oral   SpO2: 98%   Weight: 68.4 kg (150 lb 12.7 oz)   Height: 5' 7" (1.702 m)       Physical Exam  HENT:      Head: Normocephalic and atraumatic.      Nose: Nose normal.      Mouth/Throat:      Mouth: Mucous membranes are moist.   Eyes:      General: Scleral icterus present.   Cardiovascular:      Rate and Rhythm: Normal rate.      Pulses: Normal pulses.   Pulmonary:      Effort: Pulmonary effort is normal.   Abdominal:      Palpations: Abdomen is soft.      Tenderness: There is no abdominal tenderness. There is no guarding.   Skin:     General: Skin is warm and dry.      Coloration: Skin is jaundiced.   Neurological:      Mental Status: He is alert and oriented to person, place, and time. Mental status is at baseline.   Psychiatric:         Mood and Affect: Mood normal.         Behavior: Behavior normal.       LABS: I personally reviewed pertinent laboratory findings.    Lab Results   Component Value Date    WBC 12.42 07/30/2025    HGB 9.5 (L) 07/30/2025    HCT 30.0 (L) 07/30/2025    MCV 99 (H) 07/30/2025     (H) 07/30/2025       Lab Results   Component Value Date     07/30/2025    K 3.7 07/30/2025     07/30/2025    CO2 24 07/30/2025    BUN 13 07/30/2025    CREATININE 0.6 07/30/2025    CALCIUM 7.9 (L) 07/30/2025    ANIONGAP 7 (L) 07/30/2025    ESTGFRAFRICA >60.0 10/02/2021    EGFRNONAA >60.0 10/02/2021       Lab Results   Component Value Date     " "(H) 07/30/2025     (H) 07/30/2025     (H) 07/30/2025    ALKPHOS 653 (H) 07/30/2025    BILITOT 5.8 (H) 07/30/2025       Lab Results   Component Value Date    HEPAIGM Negative 10/01/2021    HEPBIGM Negative 10/01/2021    HEPCAB Non-reactive 10/19/2021       No results found for: "NAIF", "MITOAB", "SMOOTHMUSCAB", "IGG", "CERULOPLSM"     MELD 3.0: 18 at 7/30/2025  6:25 AM  MELD-Na: 14 at 7/30/2025  6:25 AM  Calculated from:  Serum Creatinine: 0.6 mg/dL (Using min of 1 mg/dL) at 7/30/2025  6:25 AM  Serum Sodium: 140 mmol/L (Using max of 137 mmol/L) at 7/30/2025  6:25 AM  Total Bilirubin: 5.8 mg/dL at 7/30/2025  6:25 AM  Serum Albumin: 1.8 g/dL at 7/30/2025  6:25 AM  INR(ratio): 1.1 at 7/30/2025  6:25 AM  Age at listing (hypothetical): 65 years  Sex: Male at 7/30/2025  6:25 AM     IMAGING: I personally reviewed imaging studies.    Assessment:     1. Hepatic cirrhosis, unspecified hepatic cirrhosis type, unspecified whether ascites present    2. Biliary obstruction    3. Encounter for screening for other viral diseases    4. Other abnormal tumor markers      Recommendations:  - Check labs today. If renal function stable, double diuretics. Repeat CMP in 1 week to monitor renal function if diuretics changed.   - Will f/u in clinic in 6 weeks. Repeat Ca 19-9 at that time as obstruction can cause elevation. Will monitor trend. Check MELD labs prior to f/u as well.   - Will need to f/u with [facility] and consider re-sampling the bile duct and for management of biliary stricture. Consideration for underlying PSC given concurrent UC.   - Obtain CMP, CBC and INR today  - Check immunity to Hep A and Hep B    - Repeat AFP and US in 6 months -  around 1/2026 for screening purposes.     Return to clinic in 6 weeks.     I have sent communication to the referring physician and/or primary care provider.    Brandy Rockwell DO   Fellow PGY- VI   Hepatology and Liver Transplant  [facility]  [facility]       [1]   Social History  Tobacco Use    Smoking status: Never    Smokeless tobacco: Never   Substance Use Topics    Alcohol use: Not Currently     Comment: not for a year    Drug use: Not Currently     Types: Marijuana   [2]   Current Outpatient Medications   Medication Sig Dispense Refill    buPROPion (WELLBUTRIN SR) 100 MG TBSR 12 hr tablet Take 100 mg by mouth 2 (two) times daily.      cholecalciferol, vitamin D3, (VITAMIN D3) 50 mcg (2,000 unit) Cap capsule Take 1 capsule (2,000 Units total) by mouth once daily. 90 capsule 3    gabapentin (NEURONTIN) 600 MG tablet Take 0.5 tablets (300 mg total) by mouth 3 (three) times daily. 45 tablet 11    levoFLOXacin (LEVAQUIN) 750 MG tablet Take 1 tablet (750 mg total) by mouth once daily. for 9 days 9 tablet 0    mesalamine (DELZICOL) 400 mg cdti cdti capsule Take 2 capsules (800 mg total) by mouth 3 (three) times daily. 180 capsule 11    mupirocin (BACTROBAN) 2 % ointment Apply by Nasal route 2 (two) times daily. for 3 days 22 g 0     No current facility-administered medications for this visit.

## 2025-08-07 ENCOUNTER — TELEPHONE (OUTPATIENT)
Dept: HEPATOLOGY | Facility: CLINIC | Age: 65
End: 2025-08-07
Payer: MEDICARE

## 2025-08-07 ENCOUNTER — TELEPHONE (OUTPATIENT)
Dept: GASTROENTEROLOGY | Facility: CLINIC | Age: 65
End: 2025-08-07
Payer: MEDICARE

## 2025-08-07 DIAGNOSIS — K74.60 HEPATIC CIRRHOSIS, UNSPECIFIED HEPATIC CIRRHOSIS TYPE, UNSPECIFIED WHETHER ASCITES PRESENT: Primary | ICD-10-CM

## 2025-08-07 NOTE — TELEPHONE ENCOUNTER
Called the patient to schedule labs. Spoke with Ms. Marielos kellogg's daughter.  She stated that they want to do the labs at his PCP's office.  She provided fax # [phone].  Faxed the order.

## 2025-08-07 NOTE — TELEPHONE ENCOUNTER
----- Message from Brandy Rockwell DO sent at 8/7/2025 11:09 AM CDT -----  Regarding: CMP  Please call patient to schedule CMP next week. Ordered for Tuesday, but can be any day next week after Tuesday. Ideally Tuesday or Wednesday.     Best,    Dr. Rockwell

## 2025-08-08 ENCOUNTER — TELEPHONE (OUTPATIENT)
Dept: GASTROENTEROLOGY | Facility: CLINIC | Age: 65
End: 2025-08-08
Payer: MEDICARE

## 2025-08-08 DIAGNOSIS — K91.89 BILIARY ANASTOMOTIC STENOSIS: Primary | ICD-10-CM

## 2025-08-08 DIAGNOSIS — K83.1 BILIARY ANASTOMOTIC STENOSIS: Primary | ICD-10-CM

## 2025-08-12 ENCOUNTER — TELEPHONE (OUTPATIENT)
Dept: HEPATOLOGY | Facility: CLINIC | Age: 65
End: 2025-08-12
Payer: MEDICARE

## 2025-08-20 ENCOUNTER — INITIAL CONSULT (OUTPATIENT)
Dept: VASCULAR SURGERY | Facility: CLINIC | Age: 65
End: 2025-08-20
Payer: MEDICARE

## 2025-08-20 ENCOUNTER — TELEPHONE (OUTPATIENT)
Dept: VASCULAR SURGERY | Facility: CLINIC | Age: 65
End: 2025-08-20
Payer: MEDICARE

## 2025-08-20 VITALS
WEIGHT: 138.88 LBS | TEMPERATURE: 98 F | DIASTOLIC BLOOD PRESSURE: 81 MMHG | BODY MASS INDEX: 21.8 KG/M2 | HEART RATE: 69 BPM | HEIGHT: 67 IN | SYSTOLIC BLOOD PRESSURE: 122 MMHG

## 2025-08-20 DIAGNOSIS — A41.9 SEPSIS, DUE TO UNSPECIFIED ORGANISM, UNSPECIFIED WHETHER ACUTE ORGAN DYSFUNCTION PRESENT: Primary | ICD-10-CM

## 2025-08-20 PROCEDURE — 99999 PR PBB SHADOW E&M-EST. PATIENT-LVL III: CPT | Mod: PBBFAC,,, | Performed by: SURGERY

## 2025-08-20 PROCEDURE — 99213 OFFICE O/P EST LOW 20 MIN: CPT | Mod: PBBFAC | Performed by: SURGERY

## 2025-08-22 ENCOUNTER — HOSPITAL ENCOUNTER (EMERGENCY)
Facility: HOSPITAL | Age: 65
Discharge: HOME | End: 2025-08-22
Attending: EMERGENCY MEDICINE
Payer: MEDICARE

## 2025-08-22 VITALS
TEMPERATURE: 98 F | HEART RATE: 70 BPM | OXYGEN SATURATION: 97 % | HEIGHT: 67 IN | BODY MASS INDEX: 22.63 KG/M2 | RESPIRATION RATE: 18 BRPM | WEIGHT: 144.19 LBS | DIASTOLIC BLOOD PRESSURE: 80 MMHG | SYSTOLIC BLOOD PRESSURE: 131 MMHG

## 2025-08-22 DIAGNOSIS — R11.0 NAUSEA: ICD-10-CM

## 2025-08-22 DIAGNOSIS — R10.11 RIGHT UPPER QUADRANT ABDOMINAL PAIN: Primary | ICD-10-CM

## 2025-08-22 DIAGNOSIS — Z98.890 HISTORY OF BILIARY STENT INSERTION: ICD-10-CM

## 2025-08-22 LAB
ABSOLUTE EOSINOPHIL (OHS): 0.11 K/UL
ABSOLUTE MONOCYTE (OHS): 0.88 K/UL (ref 0.3–1)
ABSOLUTE NEUTROPHIL COUNT (OHS): 4.71 K/UL (ref 1.8–7.7)
ALBUMIN SERPL BCP-MCNC: 3.3 G/DL (ref 3.5–5.2)
ALP SERPL-CCNC: 239 UNIT/L (ref 40–150)
ALT SERPL W/O P-5'-P-CCNC: 45 UNIT/L (ref 10–44)
ANION GAP (OHS): 9 MMOL/L (ref 8–16)
AST SERPL-CCNC: 60 UNIT/L (ref 11–45)
BASOPHILS # BLD AUTO: 0.05 K/UL
BASOPHILS NFR BLD AUTO: 0.7 %
BILIRUB SERPL-MCNC: 2.4 MG/DL (ref 0.1–1)
BILIRUB UR QL STRIP.AUTO: NEGATIVE
BUN SERPL-MCNC: 14 MG/DL (ref 8–23)
CALCIUM SERPL-MCNC: 9.1 MG/DL (ref 8.7–10.5)
CHLORIDE SERPL-SCNC: 105 MMOL/L (ref 95–110)
CLARITY UR: CLEAR
CO2 SERPL-SCNC: 27 MMOL/L (ref 23–29)
COLOR UR AUTO: YELLOW
CREAT SERPL-MCNC: 0.6 MG/DL (ref 0.5–1.4)
ERYTHROCYTE [DISTWIDTH] IN BLOOD BY AUTOMATED COUNT: 16 % (ref 11.5–14.5)
GFR SERPLBLD CREATININE-BSD FMLA CKD-EPI: >60 ML/MIN/1.73/M2
GLUCOSE SERPL-MCNC: 125 MG/DL (ref 70–110)
GLUCOSE UR QL STRIP: NEGATIVE
HCT VFR BLD AUTO: 39.8 % (ref 40–54)
HCV AB SERPL QL IA: NORMAL
HGB BLD-MCNC: 13.2 GM/DL (ref 14–18)
HGB UR QL STRIP: NEGATIVE
HIV 1+2 AB+HIV1 P24 AG SERPL QL IA: NORMAL
IMM GRANULOCYTES # BLD AUTO: 0.02 K/UL (ref 0–0.04)
IMM GRANULOCYTES NFR BLD AUTO: 0.3 % (ref 0–0.5)
KETONES UR QL STRIP: NEGATIVE
LEUKOCYTE ESTERASE UR QL STRIP: NEGATIVE
LIPASE SERPL-CCNC: 29 U/L (ref 4–60)
LYMPHOCYTES # BLD AUTO: 1.05 K/UL (ref 1–4.8)
MCH RBC QN AUTO: 32.8 PG (ref 27–31)
MCHC RBC AUTO-ENTMCNC: 33.2 G/DL (ref 32–36)
MCV RBC AUTO: 99 FL (ref 82–98)
NITRITE UR QL STRIP: NEGATIVE
NUCLEATED RBC (/100WBC) (OHS): 0 /100 WBC
PH UR STRIP: 6 [PH]
PLATELET # BLD AUTO: 257 K/UL (ref 150–450)
PMV BLD AUTO: 8.5 FL (ref 9.2–12.9)
POTASSIUM SERPL-SCNC: 3.5 MMOL/L (ref 3.5–5.1)
PROT SERPL-MCNC: 7.3 GM/DL (ref 6–8.4)
PROT UR QL STRIP: NEGATIVE
RBC # BLD AUTO: 4.02 M/UL (ref 4.6–6.2)
RELATIVE EOSINOPHIL (OHS): 1.6 %
RELATIVE LYMPHOCYTE (OHS): 15.4 % (ref 18–48)
RELATIVE MONOCYTE (OHS): 12.9 % (ref 4–15)
RELATIVE NEUTROPHIL (OHS): 69.1 % (ref 38–73)
SODIUM SERPL-SCNC: 141 MMOL/L (ref 136–145)
SP GR UR STRIP: 1.02
UROBILINOGEN UR STRIP-ACNC: NEGATIVE EU/DL
WBC # BLD AUTO: 6.82 K/UL (ref 3.9–12.7)

## 2025-08-22 PROCEDURE — 96376 TX/PRO/DX INJ SAME DRUG ADON: CPT

## 2025-08-22 PROCEDURE — 25500020 PHARM REV CODE 255: Performed by: EMERGENCY MEDICINE

## 2025-08-22 PROCEDURE — 96374 THER/PROPH/DIAG INJ IV PUSH: CPT

## 2025-08-22 PROCEDURE — 85025 COMPLETE CBC W/AUTO DIFF WBC: CPT | Performed by: NURSE PRACTITIONER

## 2025-08-22 PROCEDURE — 96375 TX/PRO/DX INJ NEW DRUG ADDON: CPT

## 2025-08-22 PROCEDURE — 87389 HIV-1 AG W/HIV-1&-2 AB AG IA: CPT | Performed by: EMERGENCY MEDICINE

## 2025-08-22 PROCEDURE — 82947 ASSAY GLUCOSE BLOOD QUANT: CPT | Performed by: NURSE PRACTITIONER

## 2025-08-22 PROCEDURE — 99285 EMERGENCY DEPT VISIT HI MDM: CPT | Mod: 25

## 2025-08-22 PROCEDURE — 86803 HEPATITIS C AB TEST: CPT | Performed by: EMERGENCY MEDICINE

## 2025-08-22 PROCEDURE — 81003 URINALYSIS AUTO W/O SCOPE: CPT | Performed by: NURSE PRACTITIONER

## 2025-08-22 PROCEDURE — 86301 IMMUNOASSAY TUMOR CA 19-9: CPT | Performed by: EMERGENCY MEDICINE

## 2025-08-22 PROCEDURE — 63600175 PHARM REV CODE 636 W HCPCS: Performed by: EMERGENCY MEDICINE

## 2025-08-22 PROCEDURE — 83690 ASSAY OF LIPASE: CPT | Performed by: NURSE PRACTITIONER

## 2025-08-22 RX ORDER — MORPHINE SULFATE 4 MG/ML
4 INJECTION, SOLUTION INTRAMUSCULAR; INTRAVENOUS
Refills: 0 | Status: COMPLETED | OUTPATIENT
Start: 2025-08-22 | End: 2025-08-22

## 2025-08-22 RX ORDER — ONDANSETRON HYDROCHLORIDE 2 MG/ML
4 INJECTION, SOLUTION INTRAVENOUS
Status: COMPLETED | OUTPATIENT
Start: 2025-08-22 | End: 2025-08-22

## 2025-08-22 RX ORDER — ONDANSETRON 4 MG/1
4 TABLET, FILM COATED ORAL EVERY 8 HOURS PRN
Qty: 15 TABLET | Refills: 0 | Status: SHIPPED | OUTPATIENT
Start: 2025-08-22 | End: 2025-08-27

## 2025-08-22 RX ORDER — HYDROCODONE BITARTRATE AND ACETAMINOPHEN 10; 325 MG/1; MG/1
1 TABLET ORAL EVERY 6 HOURS PRN
Qty: 20 TABLET | Refills: 0 | Status: SHIPPED | OUTPATIENT
Start: 2025-08-22 | End: 2025-08-27

## 2025-08-22 RX ADMIN — MORPHINE SULFATE 4 MG: 4 INJECTION INTRAVENOUS at 03:08

## 2025-08-22 RX ADMIN — ONDANSETRON 4 MG: 2 INJECTION INTRAMUSCULAR; INTRAVENOUS at 04:08

## 2025-08-22 RX ADMIN — MORPHINE SULFATE 4 MG: 4 INJECTION INTRAVENOUS at 04:08

## 2025-08-22 RX ADMIN — ONDANSETRON 4 MG: 2 INJECTION INTRAMUSCULAR; INTRAVENOUS at 03:08

## 2025-08-22 RX ADMIN — IOHEXOL 100 ML: 350 INJECTION, SOLUTION INTRAVENOUS at 02:08

## 2025-08-23 LAB
CANCER AG19-9 SERPL-ACNC: 52.4 U/ML
HOLD SPECIMEN: NORMAL

## 2025-08-25 LAB — HOLD SPECIMEN: NORMAL
